# Patient Record
Sex: FEMALE | Race: WHITE | Employment: PART TIME | URBAN - METROPOLITAN AREA
[De-identification: names, ages, dates, MRNs, and addresses within clinical notes are randomized per-mention and may not be internally consistent; named-entity substitution may affect disease eponyms.]

---

## 2019-12-19 ENCOUNTER — OFFICE VISIT (OUTPATIENT)
Dept: OBGYN CLINIC | Age: 24
End: 2019-12-19

## 2019-12-19 VITALS
RESPIRATION RATE: 16 BRPM | HEIGHT: 65 IN | SYSTOLIC BLOOD PRESSURE: 112 MMHG | WEIGHT: 136.2 LBS | HEART RATE: 71 BPM | DIASTOLIC BLOOD PRESSURE: 73 MMHG | TEMPERATURE: 98.6 F | BODY MASS INDEX: 22.69 KG/M2

## 2019-12-19 DIAGNOSIS — N92.6 IRREGULAR MENSES: Primary | ICD-10-CM

## 2019-12-19 LAB
HCG URINE, QL. (POC): NEGATIVE
VALID INTERNAL CONTROL?: YES

## 2019-12-19 NOTE — PROGRESS NOTES
Chief Complaint   Patient presents with   Demetria Villaue IUD     IUD inserted 4/2019. Never had follow up visit. Pt c/o cramping, and pain with intercourse. LMP 7/2019, then spotting in 8/2019.    3 most recent PHQ Screens 12/19/2019   Little interest or pleasure in doing things Not at all   Feeling down, depressed, irritable, or hopeless Not at all   Total Score PHQ 2 0     1. Have you been to the ER, urgent care clinic since your last visit? Hospitalized since your last visit? No    2. Have you seen or consulted any other health care providers outside of the [address] [place] [address] since your last visit? Include any pap smears or colon screening.  No

## 2019-12-19 NOTE — PATIENT INSTRUCTIONS
Pregnancy Test (HCG): About This Test  What is it? A pregnancy test can check if the hormone hCG is in your blood or urine. Your body makes this hormone when you are pregnant. Many women use home pregnancy tests to find out if they are pregnant. This care sheet focuses on a pregnancy test done in a doctor's office or clinic. Why is this test done? This test will show if you are pregnant or not. A urine test checks if hCG is in your urine. A blood test can also measure the amount of hCG. If you are pregnant, your doctor will use the test results as a guide to care for you and your growing baby. How can you prepare for the test?  You don't need to do anything before the test.  What happens during the test?  A urine or blood test for pregnancy can be done in your doctor's office, clinic, or lab. · Blood test: A health professional takes a sample of your blood. · Urine test: You catch urine in a cup given to you by a health professional. When you are finished, you give the cup back. What else should you know about the test?  · This test may not show that you are pregnant if you are very early in your pregnancy. · If your tests show you aren't pregnant, but you or your doctor thinks you may be too early in your pregnancy, you may need another test.  How long does the test take? · The test will take just a few minutes. · Results from a urine test may be available right away. Blood test results may take a few days. What happens after the test?  · You can go back to your usual activities right away. If you are pregnant, you will get more information from your doctor. Follow-up care is a key part of your treatment and safety. Be sure to make and go to all appointments, and call your doctor if you are having problems. It's also a good idea to keep a list of the medicines you take. Ask your doctor when you can expect to have your test results. Where can you learn more?   Go to http://olya-vale.info/. Enter D128 in the search box to learn more about \"Pregnancy Test (HCG): About This Test.\"  Current as of: May 29, 2019  Content Version: 12.2  © 7776-5033 [employer]. Care instructions adapted under license by [employer] (which disclaims liability or warranty for this information). If you have questions about a medical condition or this instruction, always ask your healthcare professional. [address] any warranty or liability for your use of this information.

## 2019-12-19 NOTE — PROGRESS NOTES
NEW PATIENT EXAM  Young Adult Woman    SUBJECTIVE: Romana Leaf is a 27 y.o. female G0 using [address] since 2019. Pt. Reports some increased uterine cramping at times not requiring any treatment however denies changes in vaginal discharge. Pt. Reports her LMP of mid-2019 with just very light spotting during the interim. Pt. Did not 'go for a string check\" and is anxious that there may be problem since she did not get the follow up. Patient's last menstrual period was 2019. GYN History  Menarche: as teen  Contraception:  IUD  Sexual History:  unremarkable  Sexually transmitted diseases/infections: NO    Last pap: 2019  The prior Pap result: normal      History reviewed. No pertinent past medical history. History reviewed. No pertinent surgical history. OB History        0    Para   0    Term   0       0    AB   0    Living   0       SAB   0    TAB   0    Ectopic   0    Molar   0    Multiple   0    Live Births   0              History reviewed. No pertinent family history. Social History     Socioeconomic History    Marital status:      Spouse name: Not on file    Number of children: Not on file    Years of education: Not on file    Highest education level: Not on file   Occupational History    Not on file   Social Needs    Financial resource strain: Not on file    Food insecurity:     Worry: Not on file     Inability: Not on file    Transportation needs:     Medical: Not on file     Non-medical: Not on file   Tobacco Use    Smoking status: Never Smoker    Smokeless tobacco: Never Used   Substance and Sexual Activity    Alcohol use: Yes     Comment: occ wine every 2 weeks    Drug use: Never    Sexual activity: Yes     Partners: Male     Birth control/protection: I.U.D.    Lifestyle    Physical activity:     Days per week: Not on file     Minutes per session: Not on file    Stress: Not on file   Relationships    Social connections:     Talks on phone: Not on file     Gets together: Not on file     Attends Jain service: Not on file     Active member of club or organization: Not on file     Attends meetings of clubs or organizations: Not on file     Relationship status: Not on file    Intimate partner violence:     Fear of current or ex partner: Not on file     Emotionally abused: Not on file     Physically abused: Not on file     Forced sexual activity: Not on file   Other Topics Concern    Not on file   Social History Narrative    Not on file             Review of Systems:   Complete review of systems reviewed from social and history data forms. Pertinent positives in HPI. Objective:     Visit Vitals  /73 (BP 1 Location: Right arm, BP Patient Position: Sitting)   Pulse 71   Temp 98.6 °F (37 °C) (Oral)   Resp 16   Ht 5' 5\" (1.651 m)   Wt 136 lb 3.2 oz (61.8 kg)   LMP 07/16/2019   BMI 22.66 kg/m²       General:  alert, cooperative, no distress, appears stated age   Skin:  Normal.   Lymph Nodes:  Cervical, supraclavicular, and axillary nodes normal.   Breast Exam: normal appearance, no masses or tenderness    Lungs:  clear to auscultation bilaterally   Heart:  regular rate and rhythm, S1, S2 normal, no murmur, click, rub or gallop   Abdomen: soft, non-tender. Bowel sounds normal. No masses,  no organomegaly   Back:  Costovertebral angle tenderness absent   Genitourinary: BUS normal. Introitus normal. Normal appearing vaginal epithelium, Vaginal discharge described as normal and physiologic.,  Normal cervix without lesions or tenderness, string seen at os, Uterus normal size anteverted. NT., Adnexa normal in size left and right without tenderness. Extremities:  extremities normal, atraumatic, no cyanosis or edema         ASSESSMENT:      ICD-10-CM ICD-9-CM    1. Irregular menses N92.6 626.4 AMB POC URINE PREGNANCY TEST, VISUAL COLOR COMPARISON      Plan:  UPT negative today. String placement normal- reassurance given.   RTO with annual exam.   Pt. Voices understanding of treatment plan.       Arman Lesches, NP

## 2020-07-20 ENCOUNTER — TELEPHONE (OUTPATIENT)
Dept: OBGYN CLINIC | Age: 25
End: 2020-07-20

## 2020-07-20 NOTE — TELEPHONE ENCOUNTER
Patient has a IUD and she verbally said it has been in for over a year. She had a cycle at onset of insertion and no cycle since. She started with some light vaginal bleeding but no cramps recently. She said it shows up as drips of blood with urination. No cramping with urination or having any signs of UTI.;      Advised to try Ibuprofen protocol and document on a calendar. If continues to have IUD, it may be more than just the body having an \"off month \" of cycles. Will need to talk with FW if worsens or continues or bleeding heavily.

## 2020-08-05 ENCOUNTER — OFFICE VISIT (OUTPATIENT)
Dept: OBGYN CLINIC | Age: 25
End: 2020-08-05
Payer: COMMERCIAL

## 2020-08-05 VITALS
WEIGHT: 133 LBS | DIASTOLIC BLOOD PRESSURE: 72 MMHG | BODY MASS INDEX: 22.16 KG/M2 | SYSTOLIC BLOOD PRESSURE: 120 MMHG | HEIGHT: 65 IN

## 2020-08-05 DIAGNOSIS — Z01.419 WELL WOMAN EXAM: Primary | ICD-10-CM

## 2020-08-05 DIAGNOSIS — Z11.3 SCREENING EXAMINATION FOR STD (SEXUALLY TRANSMITTED DISEASE): ICD-10-CM

## 2020-08-05 PROCEDURE — 99395 PREV VISIT EST AGE 18-39: CPT | Performed by: OBSTETRICS & GYNECOLOGY

## 2020-08-05 NOTE — PATIENT INSTRUCTIONS
Pelvic Exam: Care Instructions  Your Care Instructions     When your doctor examines all of your pelvic organs, it's called a pelvic exam. Two good reasons to have this kind of exam are to check for sexually transmitted infections (STIs) and to get a Pap test. A Pap test is also called a Pap smear. It checks for early changes that can lead to cancer of the cervix. Sometimes a pelvic exam is part of a regular checkup. Your doctor may ask you to avoid vaginal sex, tampons, vaginal medicines, vaginal sprays or powders, and douching for 1 to 2 days before the test.  Other times, women have this kind of exam at any time of the month. This is because they have pelvic pain, bleeding, or discharge. Or they may have another pelvic problem. Before your exam, it's important to share some information with your doctor. For example, if you are a survivor of rape or sexual abuse, you can talk about any concerns you may have. Your doctor will also want to know if you are pregnant or use birth control. And he or she will want to hear about any problems, surgeries, or procedures you have had in your pelvic area. You will also need to tell your doctor when your last period was. Follow-up care is a key part of your treatment and safety. Be sure to make and go to all appointments, and call your doctor if you are having problems. It's also a good idea to know your test results and keep a list of the medicines you take. How is a pelvic exam done? · During a pelvic exam, you will:  ? Take off your clothes below the waist. You will get a paper or cloth cover to put over the lower half of your body. If this is regular checkup, you may undress completely and put on a gown. ? Lie on your back on an exam table. Your feet will be raised above you. Stirrups will support your feet. · The doctor will:  ? Ask you to relax your knees. Your knees need to lean out, toward the walls. ?  Check the opening of your vagina for sores or swelling. ? Gently put a tool called a speculum into your vagina. It opens the vagina a little bit. You will feel some pressure. But if you are relaxed, it will not hurt. It lets your doctor see inside the vagina. ? Use a small brush, spatula, or swab to get a sample of cells, if you are having a Pap test or culture. The doctor then removes the speculum. ? Put on gloves and put one or two fingers of one hand into your vagina. The other hand goes on your lower belly. This lets your doctor feel your pelvic organs. You will probably feel some pressure. Try to stay relaxed. ? Put one gloved finger into your rectum and one into your vagina, if needed. This can also help check your pelvic organs. This exam takes about 10 minutes. At the end, you will get a washcloth or tissue to clean your vaginal area. You can then get dressed. Why is a pelvic exam done? A pelvic exam may be done:  · As part of a woman's regular physical checkup. The exam may include a Pap test.  · To check for vaginal infection. · To check for sexually transmitted infections, such as chlamydia or herpes. · To help find the cause of abnormal uterine bleeding. · To look for problems like uterine fibroids, ovarian cysts, or uterine prolapse. · To find the cause of pelvic or belly pain. · Before inserting an intrauterine device (IUD) for birth control. · To collect evidence if you've been sexually assaulted. What are the risks of a pelvic exam?  There is a small chance that the doctor will find something on a pelvic exam that would not have caused a problem. This is called overdiagnosis. It could lead to tests or treatment you don't need. When should you call for help? Watch closely for changes in your health, and be sure to contact your doctor if you have any problems. Where can you learn more? Go to http://olya-vale.info/  Enter M421 in the search box to learn more about \"Pelvic Exam: Care Instructions. \"  Current as of: November 8, 2019               Content Version: 12.5  © 2407-4274 [employer]. Care instructions adapted under license by [employer] (which disclaims liability or warranty for this information). If you have questions about a medical condition or this instruction, always ask your healthcare professional. [address] any warranty or liability for your use of this information.

## 2020-08-05 NOTE — PROGRESS NOTES
Annual exam ages 21-44    [address] is a No obstetric history on file. ,  27 y.o. female   No LMP recorded. She presents for her annual checkup. She is having no significant problems. With regard to the Gardasil vaccine, she has received all 3 injections. Menstrual status:    Her periods are light to none due to IUD. She does not have dysmenorrhea. She reports no premenstrual symptoms. Contraception:    The current method of family planning is Hormonal IUS. Sexual history:    She  reports being sexually active and has had partner(s) who are Male. She reports using the following method of birth control/protection: I.U.D..    Medical conditions:    Since her last annual GYN exam about one year ago, she has not the following changes in her health history: none. Surgical history confirmed with patient. has a past surgical history that includes hx wisdom teeth extraction. Pap and Mammogram History:    Her most recent Pap smear was normal, obtained 1 year(s) ago. The patient has never had a mammogram.    Breast Cancer History/Substance Abuse: negative    No past medical history on file. Past Surgical History:   Procedure Laterality Date    HX WISDOM TEETH EXTRACTION         Current Outpatient Medications   Medication Sig Dispense Refill    miSOPROStol (CYTOTEC) 200 mcg tablet Take 1 Tab by mouth four (4) times daily. Take 1 by mouth, and insert 1 vaginally the night before your procedure 2 Tab 0     Allergies: Patient has no known allergies. Tobacco History:  reports that she has never smoked. She has never used smokeless tobacco.  Alcohol Abuse:  reports current alcohol use. Drug Abuse:  reports no history of drug use. Family Medical/Cancer History: No family history on file.      Review of Systems - History obtained from the patient  Constitutional: negative for weight loss, fever, night sweats  HEENT: negative for hearing loss, earache, congestion, snoring, sorethroat  CV: negative for chest pain, palpitations, edema  Resp: negative for cough, shortness of breath, wheezing  GI: negative for change in bowel habits, abdominal pain, black or bloody stools  : negative for frequency, dysuria, hematuria, vaginal discharge  MSK: negative for back pain, joint pain, muscle pain  Breast: negative for breast lumps, nipple discharge, galactorrhea  Skin :negative for itching, rash, hives  Neuro: negative for dizziness, headache, confusion, weakness  Psych: negative for anxiety, depression, change in mood  Heme/lymph: negative for bleeding, bruising, pallor    Physical Exam    Constitutional  · Appearance: well-nourished, well developed, alert, in no acute distress    HENT  · Head and Face: appears normal    Neck  · Inspection/Palpation: normal appearance, no masses or tenderness  · Lymph Nodes: no lymphadenopathy present  · Thyroid: gland size normal, nontender, no nodules or masses present on palpation    Chest  · Respiratory Effort: breathing unlabored    Breasts  · Inspection of Breasts: breasts symmetrical, no skin changes, no discharge present, nipple appearance normal, no skin retraction present  · Palpation of Breasts and Axillae: no masses present on palpation, no breast tenderness  · Axillary Lymph Nodes: no lymphadenopathy present    Gastrointestinal  · Abdominal Examination: abdomen non-tender to palpation, normal bowel sounds, no masses present  · Liver and spleen: no hepatomegaly present, spleen not palpable  · Hernias: no hernias identified    Genitourinary  · External Genitalia: normal appearance for age, no discharge present, no tenderness present, no inflammatory lesions present, no masses present, no atrophy present  · Vagina: normal vaginal vault without central or paravaginal defects, no discharge present, no inflammatory lesions present, no masses present  · Bladder: non-tender to palpation  · Urethra: appears normal  · Cervix: normal, iud strings appropriate length   · Uterus: normal size, shape and consistency  · Adnexa: no adnexal tenderness present, no adnexal masses present  · Perineum: perineum within normal limits, no evidence of trauma, no rashes or skin lesions present  · Anus: anus within normal limits, no hemorrhoids present  · Inguinal Lymph Nodes: no lymphadenopathy present    Skin  · General Inspection: no rash, no lesions identified    Neurologic/Psychiatric  · Mental Status:  · Orientation: grossly oriented to person, place and time  · Mood and Affect: mood normal, affect appropriate    Assessment:  Routine gynecologic examination  Her current medical status is satisfactory with no evidence of significant gynecologic issues.     Plan:  Counseled re: diet, exercise, healthy lifestyle  Return for yearly wellness visits  Gardisil counseling provided

## 2020-08-10 LAB
C TRACH RRNA SPEC QL NAA+PROBE: NEGATIVE
N GONORRHOEA RRNA SPEC QL NAA+PROBE: NEGATIVE
T VAGINALIS DNA SPEC QL NAA+PROBE: NEGATIVE

## 2021-08-16 NOTE — PROGRESS NOTES
Annual exam ages 21-44    [address] is a No obstetric history on file. ,  23 y.o. female   No LMP recorded. (Menstrual status: IUD). She presents for her annual checkup. She is having no significant problems. With regard to the Gardasil vaccine, she has received all 3 injections. Menstrual status:    Her periods are minimal to none using IUD. Contraception:    The current method of family planning is Hormonal IUS. Sexual history:    She  reports being sexually active and has had partner(s) who are Male. She reports using the following method of birth control/protection: I.U.D..    Medical conditions:    Since her last annual GYN exam about one year ago, she has not the following changes in her health history: none. Surgical history confirmed with patient. has a past surgical history that includes hx wisdom teeth extraction. Pap and Mammogram History:    Her most recent Pap smear was normal, obtained 2 year(s) ago. The patient has never had a mammogram.    Breast Cancer History/Substance Abuse: negative    Past Medical History:   Diagnosis Date    IUD (intrauterine device) in place 03/2019     Past Surgical History:   Procedure Laterality Date    HX WISDOM TEETH EXTRACTION         Current Outpatient Medications   Medication Sig Dispense Refill    miSOPROStol (CYTOTEC) 200 mcg tablet Take 1 Tab by mouth four (4) times daily. Take 1 by mouth, and insert 1 vaginally the night before your procedure 2 Tab 0     Allergies: Patient has no known allergies. Tobacco History:  reports that she has never smoked. She has never used smokeless tobacco.  Alcohol Abuse:  reports current alcohol use. Drug Abuse:  reports no history of drug use.     Family Medical/Cancer History:   Family History   Problem Relation Age of Onset    No Known Problems Mother         Review of Systems - History obtained from the patient  Constitutional: negative for weight loss, fever, night sweats  HEENT: negative for hearing loss, earache, congestion, snoring, sorethroat  CV: negative for chest pain, palpitations, edema  Resp: negative for cough, shortness of breath, wheezing  GI: negative for change in bowel habits, abdominal pain, black or bloody stools  : negative for frequency, dysuria, hematuria, vaginal discharge  MSK: negative for back pain, joint pain, muscle pain  Breast: negative for breast lumps, nipple discharge, galactorrhea  Skin :negative for itching, rash, hives  Neuro: negative for dizziness, headache, confusion, weakness  Psych: negative for anxiety, depression, change in mood  Heme/lymph: negative for bleeding, bruising, pallor    Physical Exam    There were no vitals taken for this visit.     Constitutional  · Appearance: well-nourished, well developed, alert, in no acute distress    HENT  · Head and Face: appears normal    Neck  · Inspection/Palpation: normal appearance, no masses or tenderness  · Lymph Nodes: no lymphadenopathy present  · Thyroid: gland size normal, nontender, no nodules or masses present on palpation    Chest  · Respiratory Effort: breathing unlabored    Breasts  · Inspection of Breasts: breasts symmetrical, no skin changes, no discharge present, nipple appearance normal, no skin retraction present  · Palpation of Breasts and Axillae: no masses present on palpation, no breast tenderness  · Axillary Lymph Nodes: no lymphadenopathy present    Gastrointestinal  · Abdominal Examination: abdomen non-tender to palpation, normal bowel sounds, no masses present  · Liver and spleen: no hepatomegaly present, spleen not palpable  · Hernias: no hernias identified    Genitourinary  · External Genitalia: normal appearance for age, no discharge present, no tenderness present, no inflammatory lesions present, no masses present, no atrophy present  · Vagina: normal vaginal vault without central or paravaginal defects, no discharge present, no inflammatory lesions present, no masses present  · Bladder: non-tender to palpation  · Urethra: appears normal  · Cervix: normal, iud strings appropriate length   · Uterus: normal size, shape and consistency  · Adnexa: no adnexal tenderness present, no adnexal masses present  · Perineum: perineum within normal limits, no evidence of trauma, no rashes or skin lesions present  · Anus: anus within normal limits, no hemorrhoids present  · Inguinal Lymph Nodes: no lymphadenopathy present    Skin  · General Inspection: no rash, no lesions identified    Neurologic/Psychiatric  · Mental Status:  · Orientation: grossly oriented to person, place and time  · Mood and Affect: mood normal, affect appropriate    Assessment:  Routine gynecologic examination  Her current medical status is satisfactory with no evidence of significant gynecologic issues.     Plan:  Counseled re: diet, exercise, healthy lifestyle  Return for yearly wellness visits

## 2021-08-16 NOTE — PATIENT INSTRUCTIONS
Learning About Birth Control: Intrauterine Device (IUD)  What is an intrauterine device (IUD)? The intrauterine device (IUD) is used to prevent pregnancy. It's a small, plastic, T-shaped device. Your doctor places the IUD in your uterus. If you and your doctor discuss it before you give birth, this can be done right after you have your baby. You have a choice between a hormonal IUD and a copper IUD. The hormonal IUD can prevent pregnancy for 3 to 6 years, depending on which IUD is used. But your doctor may talk to you about leaving it in for longer. When you have it, you don't have to do anything else to prevent pregnancy. The copper IUD can prevent pregnancy for 10 years. But your doctor may talk to you about leaving it in for longer. When you have it, you don't have to do anything else to prevent pregnancy. A string tied to the end of the IUD hangs down through the opening of the uterus (called the cervix) into the vagina. You can check that the IUD is in place by feeling for the string. The IUD usually stays in the uterus until your doctor removes it. How well does an IUD for birth control work? In the first year of use:  · When the hormonal IUD is used exactly as directed, fewer than 1 out of 100 women have an unplanned pregnancy. · When the copper IUD is used exactly as directed, fewer than 1 out of 100 women have an unplanned pregnancy. Be sure to tell your doctor about any health problems you have or medicines you take. Your doctor can help you choose the birth control method that's right for you. What are the advantages of an IUD? · An IUD is one of the most effective methods of birth control. · It prevents pregnancy for 3 to 10 years, depending on the type. You don't have to worry about birth control during this time. · It's safe to use while breastfeeding. · IUDs don't contain estrogen.  So you can use an IUD if you don't want to take estrogen or can't take estrogen because you have certain health problems or concerns. · An IUD is convenient. It is always providing birth control. You don't need to remember to take a pill or get a shot. You don't have to interrupt sex to protect against pregnancy. · A hormonal IUD may reduce heavy bleeding and cramping. What are the disadvantages of an IUD? · An IUD doesn't protect against sexually transmitted infections (STIs), such as herpes or HIV/AIDS. If you aren't sure if your sex partner might have an STI, use a condom to protect against disease. · A copper IUD may cause periods with more bleeding and cramping. · You have to see a doctor to have an IUD inserted and removed. Where can you learn more? Go to http://www.gray.com/  Enter B486 in the search box to learn more about \"Learning About Birth Control: Intrauterine Device (IUD). \"  Current as of: October 8, 2020               Content Version: 12.8  © 2006-2021 Healthwise, [facility]. Care instructions adapted under license by [employer] (which disclaims liability or warranty for this information). If you have questions about a medical condition or this instruction, always ask your healthcare professional. [place] [address] any warranty or liability for your use of this information.

## 2021-08-17 ENCOUNTER — OFFICE VISIT (OUTPATIENT)
Dept: OBGYN CLINIC | Age: 26
End: 2021-08-17
Payer: COMMERCIAL

## 2021-08-17 VITALS — DIASTOLIC BLOOD PRESSURE: 68 MMHG | WEIGHT: 135 LBS | BODY MASS INDEX: 22.47 KG/M2 | SYSTOLIC BLOOD PRESSURE: 108 MMHG

## 2021-08-17 DIAGNOSIS — Z11.3 SCREENING EXAMINATION FOR STD (SEXUALLY TRANSMITTED DISEASE): ICD-10-CM

## 2021-08-17 DIAGNOSIS — Z01.419 WELL WOMAN EXAM: Primary | ICD-10-CM

## 2021-08-17 PROCEDURE — 99395 PREV VISIT EST AGE 18-39: CPT | Performed by: OBSTETRICS & GYNECOLOGY

## 2021-08-19 LAB
C TRACH RRNA SPEC QL NAA+PROBE: NEGATIVE
N GONORRHOEA RRNA SPEC QL NAA+PROBE: NEGATIVE
SPECIMEN STATUS REPORT, ROLRST: NORMAL
T VAGINALIS DNA SPEC QL NAA+PROBE: NEGATIVE

## 2022-10-20 NOTE — PROGRESS NOTES
1. Have you been to the ER, urgent care clinic since your last visit? Hospitalized since your last visit? No    2. Have you seen or consulted any other health care providers outside of the [address] [place] [address] since your last visit? Include any pap smears or colon screening. No       Rupal Enter is a 24 y.o. female returns for an annual exam     No LMP recorded. (Menstrual status: IUD). Problems: no significant problems    Birth Control: IUD. Last Pap: normal obtained 3 year(s) ago. Last Mammogram: has never had a mammogram.      With regard to the Gardisil vaccine, she has received all 3 injections. Examination chaperoned by Dorie Arriaga.   Dorie Arriaga, [facility] [place]

## 2022-10-21 ENCOUNTER — OFFICE VISIT (OUTPATIENT)
Dept: OBGYN CLINIC | Age: 27
End: 2022-10-21
Payer: COMMERCIAL

## 2022-10-21 VITALS
WEIGHT: 138 LBS | SYSTOLIC BLOOD PRESSURE: 104 MMHG | HEIGHT: 64 IN | DIASTOLIC BLOOD PRESSURE: 73 MMHG | BODY MASS INDEX: 23.56 KG/M2

## 2022-10-21 DIAGNOSIS — Z01.419 WELL WOMAN EXAM: Primary | ICD-10-CM

## 2022-10-21 DIAGNOSIS — Z11.3 SCREENING EXAMINATION FOR STD (SEXUALLY TRANSMITTED DISEASE): ICD-10-CM

## 2022-10-21 PROCEDURE — 99395 PREV VISIT EST AGE 18-39: CPT | Performed by: OBSTETRICS & GYNECOLOGY

## 2022-10-21 NOTE — PROGRESS NOTES
Annual exam ages 23-44    [address] is a No obstetric history on file. ,  24 y.o. female   No LMP recorded. (Menstrual status: IUD). She presents for her annual checkup. She is having no significant problems. Menstrual status:    Her periods are absent in flow due to IUD    She does not have dysmenorrhea. She reports no premenstrual symptoms. Contraception:    The current method of family planning is Hormonal IUS. Sexual history:    She  reports being sexually active and has had partner(s) who are male. She reports using the following method of birth control/protection: I.U.D..    Medical conditions:    Since her last annual GYN exam about one year ago, she has not the following changes in her health history: none. Surgical history confirmed with patient. has a past surgical history that includes hx wisdom teeth extraction. Per Nursing Note today:  No LMP recorded. (Menstrual status: IUD). Problems: no significant problems   Birth Control: IUD. Last Pap: normal obtained 3 year(s) ago. Last Mammogram: has never had a mammogram.     With regard to the Gardisil vaccine, she has received all 3 injections. Past Medical History:   Diagnosis Date    IUD (intrauterine device) in place 03/2019     Past Surgical History:   Procedure Laterality Date    HX WISDOM TEETH EXTRACTION         Current Outpatient Medications   Medication Sig Dispense Refill    miSOPROStol (CYTOTEC) 200 mcg tablet Take 1 Tab by mouth four (4) times daily. Take 1 by mouth, and insert 1 vaginally the night before your procedure (Patient not taking: No sig reported) 2 Tab 0     Allergies: Patient has no known allergies. Tobacco History:  reports that she has never smoked. She has never used smokeless tobacco.  Alcohol Abuse:  reports current alcohol use. Drug Abuse:  reports no history of drug use.     Family Medical/Cancer History:   Family History   Problem Relation Age of Onset    No Known Problems Mother Review of Systems - History obtained from the patient  Constitutional: negative for weight loss, fever, night sweats  HEENT: negative for hearing loss, earache, congestion, snoring, sorethroat  CV: negative for chest pain, palpitations, edema  Resp: negative for cough, shortness of breath, wheezing  GI: negative for change in bowel habits, abdominal pain, black or bloody stools  : negative for frequency, dysuria, hematuria, vaginal discharge  MSK: negative for back pain, joint pain, muscle pain  Breast: negative for breast lumps, nipple discharge, galactorrhea  Skin :negative for itching, rash, hives  Neuro: negative for dizziness, headache, confusion, weakness  Psych: negative for anxiety, depression, change in mood  Heme/lymph: negative for bleeding, bruising, pallor    Physical Exam    Visit Vitals  /73   Ht 5' 4\" (1.626 m)   Wt 138 lb (62.6 kg)   BMI 23.69 kg/m²       Constitutional  Appearance: well-nourished, well developed, alert, in no acute distress    HENT  Head and Face: appears normal    Neck  Inspection/Palpation: normal appearance, no masses or tenderness  Lymph Nodes: no lymphadenopathy present  Thyroid: gland size normal, nontender, no nodules or masses present on palpation    Chest  Respiratory Effort: breathing unlabored    Breasts  Inspection of Breasts: breasts symmetrical, no skin changes, no discharge present, nipple appearance normal, no skin retraction present  Palpation of Breasts and Axillae: no masses present on palpation, no breast tenderness  Axillary Lymph Nodes: no lymphadenopathy present    Gastrointestinal  Abdominal Examination: abdomen non-tender to palpation, normal bowel sounds, no masses present  Liver and spleen: no hepatomegaly present, spleen not palpable  Hernias: no hernias identified    Genitourinary  External Genitalia: normal appearance for age, no discharge present, no tenderness present, no inflammatory lesions present, no masses present, no atrophy present  Vagina: normal vaginal vault without central or paravaginal defects, no discharge present, no inflammatory lesions present, no masses present  Bladder: non-tender to palpation  Urethra: appears normal  Cervix: normal, iud strings appropriate length   Uterus: normal size, shape and consistency  Adnexa: no adnexal tenderness present, no adnexal masses present  Perineum: perineum within normal limits, no evidence of trauma, no rashes or skin lesions present  Anus: anus within normal limits, no hemorrhoids present  Inguinal Lymph Nodes: no lymphadenopathy present    Skin  General Inspection: no rash, no lesions identified    Neurologic/Psychiatric  Mental Status:  Orientation: grossly oriented to person, place and time  Mood and Affect: mood normal, affect appropriate    Assessment:  Routine gynecologic examination  Her current medical status is satisfactory with no evidence of significant gynecologic issues.     Plan:  Counseled re: diet, exercise, healthy lifestyle  Return for yearly wellness visits

## 2022-10-27 LAB
C TRACH RRNA CVX QL NAA+PROBE: NEGATIVE
CYTOLOGIST CVX/VAG CYTO: NORMAL
CYTOLOGY CVX/VAG DOC CYTO: NORMAL
CYTOLOGY CVX/VAG DOC THIN PREP: NORMAL
DX ICD CODE: NORMAL
LABCORP, 190119: NORMAL
Lab: NORMAL
N GONORRHOEA RRNA CVX QL NAA+PROBE: NEGATIVE
OTHER STN SPEC: NORMAL
STAT OF ADQ CVX/VAG CYTO-IMP: NORMAL

## 2023-01-28 NOTE — PROGRESS NOTES
Radames Guadalupe is a 24 y.o. female presents for a problem visit. No chief complaint on file. Problems:  none      No LMP recorded. (Menstrual status: IUD). Birth Control: IUD. Last Pap: normal obtained 10/2022        1. Have you been to the ER, urgent care clinic, or hospitalized since your last visit? No    2. Have you seen or consulted any other health care providers outside of the [address] [place] [address] since your last visit? No      Chart reviewed for the following:   Charity STRICKLAND, have reviewed the History, Physical and updated the Allergic reactions for Bhavna Vahe performed immediately prior to start of procedure:   Alexandria STRICKLAND CMA, have performed the following reviews on Radames Guadalupe prior to the start of the procedure:            * Patient was identified by name and date of birth   * Agreement on procedure being performed was verified  * Risks and Benefits explained to the patient  * Procedure site verified and marked as necessary  * Patient was positioned for comfort  * Consent was signed and verified     Time: 1:10pm    Date of procedure: 1/30/23    Procedure performed by:  Meg Majano MD    Provider assisted by: Sheyla Hartman CMA     Patient assisted by: self    How tolerated by patient: tolerated the procedure well with no complications    Post Procedural Pain Scale: 2 - Hurts Little Bit    Comments: none    Examination chaperoned by Alexandria Poole CMA.

## 2023-01-30 ENCOUNTER — OFFICE VISIT (OUTPATIENT)
Dept: OBGYN CLINIC | Age: 28
End: 2023-01-30
Payer: COMMERCIAL

## 2023-01-30 DIAGNOSIS — Z30.432 ENCOUNTER FOR IUD REMOVAL: Primary | ICD-10-CM

## 2023-01-30 PROCEDURE — 58301 REMOVE INTRAUTERINE DEVICE: CPT | Performed by: OBSTETRICS & GYNECOLOGY

## 2023-01-30 NOTE — PROGRESS NOTES
IUD REMOVAL  Indications for Removal:  Rupal Enter is a No obstetric history on file. ,  24 y.o. female WHITE/NON- whose No LMP recorded. (Menstrual status: IUD). Who presents today for IUD removal. She is possibly interested in getting pregnant soon. The IUD removal procedure was discussed with the patient and she had no further questions. Procedure: The patient was placed in a dorsal lithotomy position and appropriately draped. On bimanual exam the uterus was anterior and normal in size with no tenderness present. A speculum exam was performed and the cervix was visualized. The cervix was prepped with zephiran solution. The IUD string was visualized. Using ring forceps , the string was grasped and the IUD removed intact. The IUD was shown to the patient.

## 2023-06-07 ENCOUNTER — ROUTINE PRENATAL (OUTPATIENT)
Age: 28
End: 2023-06-07

## 2023-06-07 VITALS — BODY MASS INDEX: 23.52 KG/M2 | SYSTOLIC BLOOD PRESSURE: 113 MMHG | DIASTOLIC BLOOD PRESSURE: 73 MMHG | WEIGHT: 137 LBS

## 2023-06-07 DIAGNOSIS — Z34.01 ENCOUNTER FOR SUPERVISION OF NORMAL FIRST PREGNANCY IN FIRST TRIMESTER: ICD-10-CM

## 2023-06-07 DIAGNOSIS — Z32.01 PREGNANCY EXAMINATION OR TEST, POSITIVE RESULT: Primary | ICD-10-CM

## 2023-06-07 DIAGNOSIS — Z32.01 PREGNANCY EXAMINATION OR TEST, POSITIVE RESULT: ICD-10-CM

## 2023-06-07 PROBLEM — Z34.00 SUPERVISION OF NORMAL FIRST PREGNANCY: Status: ACTIVE | Noted: 2023-06-07

## 2023-06-07 LAB
ABO, EXTERNAL RESULT: NORMAL
HEP B, EXTERNAL RESULT: NEGATIVE
HEPATITIS C ANTIBODY, EXTERNAL RESULT: NON REACTIVE
HIV, EXTERNAL RESULT: NON REACTIVE
RH FACTOR, EXTERNAL RESULT: NEGATIVE
RPR, EXTERNAL RESULT: NON REACTIVE
RUBELLA TITER, EXTERNAL RESULT: NORMAL

## 2023-06-07 PROCEDURE — 0502F SUBSEQUENT PRENATAL CARE: CPT | Performed by: OBSTETRICS & GYNECOLOGY

## 2023-06-07 NOTE — PROGRESS NOTES
Current pregnancy history:    Linda Dey is a ,  24 y.o. female White (non-) No LMP recorded. Patient is pregnant. .  She presents for the evaluation of amenorrhea and a positive pregnancy test.    Per nursing Note:  No LMP recorded. Patient is pregnant. Last Pap: see report obtained 1 year(s) ago. LMP history:  The date of her LMP is certain. Her last menstrual period was normal and lasted for 4 to 5 days. A urine pregnancy test was positive a few weeks ago. She was not on the pill at conception. Based on her LMP, her EDC is 24 and her EGA is 7 weeks,4 days. Her menstrual cycles are regular and occur approximately every 28 days and range from 3 to 5 days. The last menses lasted the usual number of days. Ultrasound data:  She had an ultrasound done by the ultrasound tech today which revealed a viable weston pregnancy with a gestational age of 7 weeks and 4 days giving an EDC of 24. Pregnancy symptoms:     Since her LMP she has experienced urinary frequency, breast tenderness, and nausea. She has not been vomiting over the last few weeks. Associated signs and symptoms which she denies: dysuria, discharge, vaginal bleeding. Relevant past pregnancy history:              She has the following pregnancy history: healthy               She has no history of  delivery. Relevant past medical history:(relevant to this pregnancy): noncontributory. Substance history: negative for alcohol, tobacco and street drugs. Positive for nothing. Exposure history: There is/are no indoor cat/s in the home. The patient was instructed to not change the cat litter. She admits close contact with children on a regular basis. She has had chicken pox or the vaccine in the past.   Patient denies issues with domestic violence.      Genetic Screening/Teratology Counseling: (Includes patient, baby's father, or anyone in either family with:)  1.  Patient's

## 2023-06-09 LAB
BACTERIA UR CULT: NO GROWTH
SPECIMEN STATUS REPORT: NORMAL

## 2023-06-11 LAB
C TRACH RRNA SPEC QL NAA+PROBE: NEGATIVE
N GONORRHOEA RRNA SPEC QL NAA+PROBE: NEGATIVE
SPECIMEN STATUS REPORT: NORMAL
T VAGINALIS RRNA SPEC QL NAA+PROBE: NEGATIVE

## 2023-07-07 ENCOUNTER — ROUTINE PRENATAL (OUTPATIENT)
Age: 28
End: 2023-07-07

## 2023-07-07 VITALS — BODY MASS INDEX: 23.34 KG/M2 | WEIGHT: 136 LBS | SYSTOLIC BLOOD PRESSURE: 130 MMHG | DIASTOLIC BLOOD PRESSURE: 73 MMHG

## 2023-07-07 DIAGNOSIS — Z34.01 ENCOUNTER FOR SUPERVISION OF NORMAL FIRST PREGNANCY IN FIRST TRIMESTER: Primary | ICD-10-CM

## 2023-07-18 LAB
EGFR GENE MUT TESTED BLD/T: NEGATIVE
KRAS GENE MUT ANL BLD/T: NEGATIVE
Lab: ABNORMAL
Lab: POSITIVE
MICROARRAY PLATFORM: NEGATIVE
NTRA ALPHA-THALASSEMIA: POSITIVE
NTRA BATTEN DISEASE (NEURONAL CEROID LIPOFUSCINOSIS, CLN3-RELATED): NEGATIVE
NTRA BETA-HEMOGLOBINOPATHIES: NEGATIVE
NTRA BLOOM SYNDROME: NEGATIVE
NTRA CANAVAN DISEASE: NEGATIVE
NTRA CITRULLINEMIA, TYPE I: NEGATIVE
NTRA CYSTIC FIBROSIS: NEGATIVE
NTRA DUCHENNE/BECKER MUSCULAR DYSTROPHY: NEGATIVE
NTRA FAMILIAL DYSAUTONOMIA: NEGATIVE
NTRA FANCONI ANEMIA, GROUP C: NEGATIVE
NTRA FRAGILE X SYNDROME: NEGATIVE
NTRA GALACTOSEMIA: NEGATIVE
NTRA GAUCHER DISEASE: NEGATIVE
NTRA GLYCOGEN STORAGE DISEASE, TYPE 1A: NEGATIVE
NTRA ISOVALERIC ACIDEMIA: NEGATIVE
NTRA MEDIUM CHAIN ACYL-COA DEHYDROGENASE DEFICIENCY: NEGATIVE
NTRA METHYLMALONIC ACIDURIA AND HOMOCYSTINURIA, TYPE CBLC: NEGATIVE
NTRA MUCOLIPIDOSIS, TYPE IV: NEGATIVE
NTRA POLYCYSTIC KIDNEY DISEASE, AUTOSOMAL RECESSIVE: NEGATIVE
NTRA SMITH-LEMLI-OPITZ SYNDROME: NEGATIVE
NTRA SPINAL MUSCULAR ATROPHY: NEGATIVE
NTRA TAY-SACHS DISEASE: NEGATIVE
NTRA TYROSINEMIA, TYPE I: NEGATIVE
NTRA ZELLWEGER SPECTRUM DISORDERS, PEX1-RELATED: NEGATIVE

## 2023-08-07 ENCOUNTER — ROUTINE PRENATAL (OUTPATIENT)
Age: 28
End: 2023-08-07

## 2023-08-07 VITALS — SYSTOLIC BLOOD PRESSURE: 111 MMHG | BODY MASS INDEX: 23.52 KG/M2 | DIASTOLIC BLOOD PRESSURE: 69 MMHG | WEIGHT: 137 LBS

## 2023-08-07 DIAGNOSIS — Z34.02 NORMAL FIRST PREGNANCY IN SECOND TRIMESTER: Primary | ICD-10-CM

## 2023-08-07 PROCEDURE — 0502F SUBSEQUENT PRENATAL CARE: CPT | Performed by: OBSTETRICS & GYNECOLOGY

## 2023-08-09 LAB
AFP INTERP SERPL-IMP: NORMAL
AFP INTERP SERPL-IMP: NORMAL
AFP MOM SERPL: 1.53
AFP SERPL-MCNC: 50.4 NG/ML
AGE AT DELIVERY: 26.3 YR
COMMENT: NORMAL
GA METHOD: NORMAL
GA: 15.2 WEEKS
IDDM PATIENT QL: NO
Lab: NORMAL
MULTIPLE PREGNANCY: NO
NEURAL TUBE DEFECT RISK FETUS: 2523 %

## 2023-09-05 SDOH — ECONOMIC STABILITY: INCOME INSECURITY: HOW HARD IS IT FOR YOU TO PAY FOR THE VERY BASICS LIKE FOOD, HOUSING, MEDICAL CARE, AND HEATING?: NOT HARD AT ALL

## 2023-09-05 SDOH — ECONOMIC STABILITY: TRANSPORTATION INSECURITY
IN THE PAST 12 MONTHS, HAS LACK OF TRANSPORTATION KEPT YOU FROM MEETINGS, WORK, OR FROM GETTING THINGS NEEDED FOR DAILY LIVING?: NO

## 2023-09-05 SDOH — ECONOMIC STABILITY: FOOD INSECURITY: WITHIN THE PAST 12 MONTHS, YOU WORRIED THAT YOUR FOOD WOULD RUN OUT BEFORE YOU GOT MONEY TO BUY MORE.: NEVER TRUE

## 2023-09-05 SDOH — ECONOMIC STABILITY: FOOD INSECURITY: WITHIN THE PAST 12 MONTHS, THE FOOD YOU BOUGHT JUST DIDN'T LAST AND YOU DIDN'T HAVE MONEY TO GET MORE.: NEVER TRUE

## 2023-09-05 SDOH — ECONOMIC STABILITY: HOUSING INSECURITY
IN THE LAST 12 MONTHS, WAS THERE A TIME WHEN YOU DID NOT HAVE A STEADY PLACE TO SLEEP OR SLEPT IN A SHELTER (INCLUDING NOW)?: NO

## 2023-09-08 ENCOUNTER — ROUTINE PRENATAL (OUTPATIENT)
Age: 28
End: 2023-09-08

## 2023-09-08 VITALS — WEIGHT: 139 LBS | SYSTOLIC BLOOD PRESSURE: 117 MMHG | BODY MASS INDEX: 23.86 KG/M2 | DIASTOLIC BLOOD PRESSURE: 75 MMHG

## 2023-09-08 DIAGNOSIS — Z34.00 SUPERVISION OF NORMAL FIRST PREGNANCY, ANTEPARTUM: ICD-10-CM

## 2023-09-08 NOTE — PROGRESS NOTES
Pt doing well, see prenatal flowsheet. Sub optimal ultrasound views, will repeat at next visit. Physician review of ultrasound performed by technician    Today's ultrasound report and images were reviewed and discussed with the patient.   Please see images and imaging report entered by technician in PACS for more detail and progress

## 2023-10-05 NOTE — PROGRESS NOTES
After obtaining consent, and per orders of Dr. Savannah Naylor, injection of influenza given IM by Vega Stack CMA. Patient instructed to remain in clinic for 20 minutes afterwards, and to report any adverse reaction to me immediately.

## 2023-10-06 ENCOUNTER — ROUTINE PRENATAL (OUTPATIENT)
Age: 28
End: 2023-10-06

## 2023-10-06 VITALS — DIASTOLIC BLOOD PRESSURE: 72 MMHG | BODY MASS INDEX: 25.4 KG/M2 | WEIGHT: 148 LBS | SYSTOLIC BLOOD PRESSURE: 108 MMHG

## 2023-10-06 DIAGNOSIS — Z34.00 SUPERVISION OF NORMAL FIRST PREGNANCY, ANTEPARTUM: Primary | ICD-10-CM

## 2023-10-06 DIAGNOSIS — Z23 ENCOUNTER FOR IMMUNIZATION: ICD-10-CM

## 2023-11-01 NOTE — PROGRESS NOTES
After obtaining consent, and per orders of Dr. Carlos Hyman, injection of TDAP given IM by Gauri Schaeffer CMA. Patient instructed to remain in clinic for 20 minutes afterwards, and to report any adverse reaction to me immediately.

## 2023-11-03 ENCOUNTER — ROUTINE PRENATAL (OUTPATIENT)
Age: 28
End: 2023-11-03

## 2023-11-03 VITALS — DIASTOLIC BLOOD PRESSURE: 72 MMHG | SYSTOLIC BLOOD PRESSURE: 113 MMHG | WEIGHT: 155 LBS | BODY MASS INDEX: 26.61 KG/M2

## 2023-11-03 DIAGNOSIS — Z23 ENCOUNTER FOR IMMUNIZATION: ICD-10-CM

## 2023-11-03 DIAGNOSIS — Z34.00 SUPERVISION OF NORMAL FIRST PREGNANCY, ANTEPARTUM: Primary | ICD-10-CM

## 2023-11-04 LAB
ERYTHROCYTE [DISTWIDTH] IN BLOOD BY AUTOMATED COUNT: 11.8 % (ref 11.7–15.4)
GLUCOSE 1H P 50 G GLC PO SERPL-MCNC: 72 MG/DL (ref 70–139)
HCT VFR BLD AUTO: 36.2 % (ref 34–46.6)
HGB BLD-MCNC: 12.1 G/DL (ref 11.1–15.9)
MCH RBC QN AUTO: 28.7 PG (ref 26.6–33)
MCHC RBC AUTO-ENTMCNC: 33.4 G/DL (ref 31.5–35.7)
MCV RBC AUTO: 86 FL (ref 79–97)
PLATELET # BLD AUTO: 316 X10E3/UL (ref 150–450)
RBC # BLD AUTO: 4.22 X10E6/UL (ref 3.77–5.28)
WBC # BLD AUTO: 14.1 X10E3/UL (ref 3.4–10.8)

## 2023-11-17 ENCOUNTER — ROUTINE PRENATAL (OUTPATIENT)
Age: 28
End: 2023-11-17

## 2023-11-17 VITALS — WEIGHT: 155 LBS | DIASTOLIC BLOOD PRESSURE: 68 MMHG | BODY MASS INDEX: 26.61 KG/M2 | SYSTOLIC BLOOD PRESSURE: 117 MMHG

## 2023-11-17 DIAGNOSIS — Z34.00 SUPERVISION OF NORMAL FIRST PREGNANCY, ANTEPARTUM: Primary | ICD-10-CM

## 2023-11-17 PROCEDURE — 0502F SUBSEQUENT PRENATAL CARE: CPT | Performed by: OBSTETRICS & GYNECOLOGY

## 2023-11-20 ENCOUNTER — ROUTINE PRENATAL (OUTPATIENT)
Age: 28
End: 2023-11-20

## 2023-11-20 VITALS — BODY MASS INDEX: 26.61 KG/M2 | WEIGHT: 155 LBS | DIASTOLIC BLOOD PRESSURE: 76 MMHG | SYSTOLIC BLOOD PRESSURE: 115 MMHG

## 2023-11-20 DIAGNOSIS — Z34.00 SUPERVISION OF NORMAL FIRST PREGNANCY, ANTEPARTUM: ICD-10-CM

## 2023-11-20 DIAGNOSIS — R10.2 PELVIC PAIN: ICD-10-CM

## 2023-11-20 DIAGNOSIS — R30.0 DYSURIA: Primary | ICD-10-CM

## 2023-11-20 PROCEDURE — 0502F SUBSEQUENT PRENATAL CARE: CPT | Performed by: OBSTETRICS & GYNECOLOGY

## 2023-11-20 RX ORDER — ONDANSETRON 8 MG/1
8 TABLET, ORALLY DISINTEGRATING ORAL 3 TIMES DAILY PRN
Qty: 10 TABLET | Refills: 0 | Status: SHIPPED | OUTPATIENT
Start: 2023-11-20

## 2023-11-20 RX ORDER — NITROFURANTOIN 25; 75 MG/1; MG/1
100 CAPSULE ORAL 2 TIMES DAILY
Qty: 20 CAPSULE | Refills: 0 | Status: SHIPPED | OUTPATIENT
Start: 2023-11-20 | End: 2023-11-25 | Stop reason: HOSPADM

## 2023-11-20 NOTE — PROGRESS NOTES
Pt presents with c/o left sided back and pain and pelvic pain. Some cramping and nausea with morning. Urine with + blood  Cervix long and closed  No CVAT    Pelvic pain and hematuria --likely UTI, discussed other etiologies including neprholithiaiss. Strict precautions discussed. Will send urine for culture and treat with Macrobid.

## 2023-11-22 ENCOUNTER — TELEPHONE (OUTPATIENT)
Age: 28
End: 2023-11-22

## 2023-11-22 LAB — BACTERIA UR CULT: NO GROWTH

## 2023-11-22 NOTE — TELEPHONE ENCOUNTER
PT name and  verified    35 yo last ov 23, next ov 23  , 30w4d      PT calling in reference to ov on 23 for L sided back pain and was discussed at ov may be related to nephrolithiaiss , PT states she \"passed a kidney stone\" that day and the pain subsided. Her urine cx came back negative and PT states she started last night again with the same pain, LL back pain and states that a possible xray was discussed at the visit and was wondering if she should get one since the pain is back? PT denies vaginal bleeding, leaking of fluid.     FW patient  Please advise  Thank you

## 2023-11-22 NOTE — TELEPHONE ENCOUNTER
PT call returned, name and  verified    MD message relayed:  Benoit Sharma MD  to Me        23 11:09 AM  Sounds like it very well could be another kidney stone. She may want to see a urologist, but I would defer that decision to Dr. Kasandra Mcelroy. She should take tylenol/motrin as needed and if the pain becomes acutely worsened, or she has fevers she should seek care in an emergency room. Benoit Sharma MD  to Me        23 11:10 AM  Sorry, no motrin in pregnancy   Benoit Sharma MD  to Me        23 11:11 AM  And drink lots and lots of fluid in the meantime       PT verbalizes understanding.

## 2023-11-23 ENCOUNTER — HOSPITAL ENCOUNTER (INPATIENT)
Facility: HOSPITAL | Age: 28
LOS: 1 days | Discharge: HOME | DRG: 832 | End: 2023-11-25
Attending: OBSTETRICS & GYNECOLOGY | Admitting: OBSTETRICS & GYNECOLOGY
Payer: COMMERCIAL

## 2023-11-23 ENCOUNTER — APPOINTMENT (OUTPATIENT)
Facility: HOSPITAL | Age: 28
DRG: 832 | End: 2023-11-23
Payer: COMMERCIAL

## 2023-11-23 DIAGNOSIS — O26.833 CALCULUS OF KIDNEY AFFECTING PREGNANCY IN THIRD TRIMESTER: Primary | ICD-10-CM

## 2023-11-23 DIAGNOSIS — N12 PYELONEPHRITIS: ICD-10-CM

## 2023-11-23 DIAGNOSIS — N20.0 CALCULUS OF KIDNEY AFFECTING PREGNANCY IN THIRD TRIMESTER: Primary | ICD-10-CM

## 2023-11-23 PROBLEM — O23.03 PYELONEPHRITIS AFFECTING PREGNANCY IN THIRD TRIMESTER: Status: ACTIVE | Noted: 2023-11-23

## 2023-11-23 PROBLEM — O99.891 BACK PAIN AFFECTING PREGNANCY IN THIRD TRIMESTER: Status: ACTIVE | Noted: 2023-11-23

## 2023-11-23 PROBLEM — Z3A.30 30 WEEKS GESTATION OF PREGNANCY: Status: ACTIVE | Noted: 2023-11-23

## 2023-11-23 PROBLEM — M54.9 BACK PAIN AFFECTING PREGNANCY IN THIRD TRIMESTER: Status: ACTIVE | Noted: 2023-11-23

## 2023-11-23 LAB
ALBUMIN SERPL-MCNC: 3 G/DL (ref 3.5–5)
ALBUMIN/GLOB SERPL: 0.7 (ref 1.1–2.2)
ALP SERPL-CCNC: 134 U/L (ref 45–117)
ALT SERPL-CCNC: 19 U/L (ref 12–78)
ANION GAP SERPL CALC-SCNC: 9 MMOL/L (ref 5–15)
APPEARANCE UR: ABNORMAL
AST SERPL-CCNC: 24 U/L (ref 15–37)
BACTERIA URNS QL MICRO: ABNORMAL /HPF
BASOPHILS # BLD: 0.1 K/UL (ref 0–0.1)
BASOPHILS NFR BLD: 0 % (ref 0–1)
BILIRUB SERPL-MCNC: 0.6 MG/DL (ref 0.2–1)
BILIRUB UR QL: NEGATIVE
BUN SERPL-MCNC: 7 MG/DL (ref 6–20)
BUN/CREAT SERPL: 7 (ref 12–20)
CALCIUM SERPL-MCNC: 8.8 MG/DL (ref 8.5–10.1)
CHLORIDE SERPL-SCNC: 102 MMOL/L (ref 97–108)
CO2 SERPL-SCNC: 24 MMOL/L (ref 21–32)
COLOR UR: ABNORMAL
CREAT SERPL-MCNC: 1.03 MG/DL (ref 0.55–1.02)
DIFFERENTIAL METHOD BLD: ABNORMAL
EOSINOPHIL # BLD: 0 K/UL (ref 0–0.4)
EOSINOPHIL NFR BLD: 0 % (ref 0–7)
EPITH CASTS URNS QL MICRO: ABNORMAL /LPF
ERYTHROCYTE [DISTWIDTH] IN BLOOD BY AUTOMATED COUNT: 12 % (ref 11.5–14.5)
GLOBULIN SER CALC-MCNC: 4.3 G/DL (ref 2–4)
GLUCOSE SERPL-MCNC: 90 MG/DL (ref 65–100)
GLUCOSE UR STRIP.AUTO-MCNC: NEGATIVE MG/DL
HCT VFR BLD AUTO: 35.2 % (ref 35–47)
HGB BLD-MCNC: 12 G/DL (ref 11.5–16)
HGB UR QL STRIP: NEGATIVE
IMM GRANULOCYTES # BLD AUTO: 0.2 K/UL (ref 0–0.04)
IMM GRANULOCYTES NFR BLD AUTO: 1 % (ref 0–0.5)
KETONES UR QL STRIP.AUTO: 40 MG/DL
LEUKOCYTE ESTERASE UR QL STRIP.AUTO: ABNORMAL
LYMPHOCYTES # BLD: 1.7 K/UL (ref 0.8–3.5)
LYMPHOCYTES NFR BLD: 9 % (ref 12–49)
MCH RBC QN AUTO: 28 PG (ref 26–34)
MCHC RBC AUTO-ENTMCNC: 34.1 G/DL (ref 30–36.5)
MCV RBC AUTO: 82.1 FL (ref 80–99)
MONOCYTES # BLD: 1.4 K/UL (ref 0–1)
MONOCYTES NFR BLD: 7 % (ref 5–13)
NEUTS SEG # BLD: 15.9 K/UL (ref 1.8–8)
NEUTS SEG NFR BLD: 83 % (ref 32–75)
NITRITE UR QL STRIP.AUTO: NEGATIVE
NRBC # BLD: 0 K/UL (ref 0–0.01)
NRBC BLD-RTO: 0 PER 100 WBC
PH UR STRIP: 7 (ref 5–8)
PLATELET # BLD AUTO: 323 K/UL (ref 150–400)
PMV BLD AUTO: 9.5 FL (ref 8.9–12.9)
POTASSIUM SERPL-SCNC: 3.8 MMOL/L (ref 3.5–5.1)
PROT SERPL-MCNC: 7.3 G/DL (ref 6.4–8.2)
PROT UR STRIP-MCNC: ABNORMAL MG/DL
RBC # BLD AUTO: 4.29 M/UL (ref 3.8–5.2)
RBC #/AREA URNS HPF: ABNORMAL /HPF (ref 0–5)
SODIUM SERPL-SCNC: 135 MMOL/L (ref 136–145)
SP GR UR REFRACTOMETRY: 1.01 (ref 1–1.03)
URINE CULTURE IF INDICATED: ABNORMAL
UROBILINOGEN UR QL STRIP.AUTO: 0.2 EU/DL (ref 0.2–1)
WBC # BLD AUTO: 19.2 K/UL (ref 3.6–11)
WBC URNS QL MICRO: ABNORMAL /HPF (ref 0–4)

## 2023-11-23 PROCEDURE — 87086 URINE CULTURE/COLONY COUNT: CPT

## 2023-11-23 PROCEDURE — 85025 COMPLETE CBC W/AUTO DIFF WBC: CPT

## 2023-11-23 PROCEDURE — 96361 HYDRATE IV INFUSION ADD-ON: CPT

## 2023-11-23 PROCEDURE — 81001 URINALYSIS AUTO W/SCOPE: CPT

## 2023-11-23 PROCEDURE — 4A1HXCZ MONITORING OF PRODUCTS OF CONCEPTION, CARDIAC RATE, EXTERNAL APPROACH: ICD-10-PCS | Performed by: OBSTETRICS & GYNECOLOGY

## 2023-11-23 PROCEDURE — G0378 HOSPITAL OBSERVATION PER HR: HCPCS

## 2023-11-23 PROCEDURE — 80053 COMPREHEN METABOLIC PANEL: CPT

## 2023-11-23 PROCEDURE — 76770 US EXAM ABDO BACK WALL COMP: CPT

## 2023-11-23 PROCEDURE — 36415 COLL VENOUS BLD VENIPUNCTURE: CPT

## 2023-11-23 PROCEDURE — 2580000003 HC RX 258: Performed by: OBSTETRICS & GYNECOLOGY

## 2023-11-23 PROCEDURE — 6370000000 HC RX 637 (ALT 250 FOR IP): Performed by: OBSTETRICS & GYNECOLOGY

## 2023-11-23 RX ORDER — OXYCODONE HYDROCHLORIDE 5 MG/1
5 TABLET ORAL EVERY 4 HOURS PRN
Status: DISCONTINUED | OUTPATIENT
Start: 2023-11-23 | End: 2023-11-24

## 2023-11-23 RX ORDER — ONDANSETRON 2 MG/ML
4 INJECTION INTRAMUSCULAR; INTRAVENOUS EVERY 6 HOURS PRN
Status: DISCONTINUED | OUTPATIENT
Start: 2023-11-23 | End: 2023-11-25 | Stop reason: HOSPADM

## 2023-11-23 RX ORDER — OXYCODONE HYDROCHLORIDE 5 MG/1
10 TABLET ORAL EVERY 4 HOURS PRN
Status: DISCONTINUED | OUTPATIENT
Start: 2023-11-23 | End: 2023-11-24

## 2023-11-23 RX ORDER — SODIUM CHLORIDE, SODIUM LACTATE, POTASSIUM CHLORIDE, CALCIUM CHLORIDE 600; 310; 30; 20 MG/100ML; MG/100ML; MG/100ML; MG/100ML
INJECTION, SOLUTION INTRAVENOUS CONTINUOUS
Status: DISCONTINUED | OUTPATIENT
Start: 2023-11-23 | End: 2023-11-25 | Stop reason: HOSPADM

## 2023-11-23 RX ORDER — SODIUM CHLORIDE, SODIUM LACTATE, POTASSIUM CHLORIDE, CALCIUM CHLORIDE 600; 310; 30; 20 MG/100ML; MG/100ML; MG/100ML; MG/100ML
INJECTION, SOLUTION INTRAVENOUS CONTINUOUS
Status: DISCONTINUED | OUTPATIENT
Start: 2023-11-23 | End: 2023-11-23

## 2023-11-23 RX ORDER — ACETAMINOPHEN 500 MG
1000 TABLET ORAL EVERY 8 HOURS SCHEDULED
Status: DISCONTINUED | OUTPATIENT
Start: 2023-11-23 | End: 2023-11-24

## 2023-11-23 RX ORDER — SODIUM CHLORIDE, SODIUM LACTATE, POTASSIUM CHLORIDE, AND CALCIUM CHLORIDE .6; .31; .03; .02 G/100ML; G/100ML; G/100ML; G/100ML
1000 INJECTION, SOLUTION INTRAVENOUS ONCE
Status: COMPLETED | OUTPATIENT
Start: 2023-11-23 | End: 2023-11-23

## 2023-11-23 RX ORDER — ONDANSETRON 4 MG/1
4 TABLET, ORALLY DISINTEGRATING ORAL EVERY 8 HOURS PRN
Status: DISCONTINUED | OUTPATIENT
Start: 2023-11-23 | End: 2023-11-25 | Stop reason: HOSPADM

## 2023-11-23 RX ORDER — OXYCODONE HYDROCHLORIDE 5 MG/1
5 TABLET ORAL ONCE
Status: COMPLETED | OUTPATIENT
Start: 2023-11-23 | End: 2023-11-23

## 2023-11-23 RX ADMIN — SODIUM CHLORIDE, POTASSIUM CHLORIDE, SODIUM LACTATE AND CALCIUM CHLORIDE 1000 ML: 600; 310; 30; 20 INJECTION, SOLUTION INTRAVENOUS at 15:04

## 2023-11-23 RX ADMIN — SODIUM CHLORIDE, POTASSIUM CHLORIDE, SODIUM LACTATE AND CALCIUM CHLORIDE: 600; 310; 30; 20 INJECTION, SOLUTION INTRAVENOUS at 16:40

## 2023-11-23 RX ADMIN — ACETAMINOPHEN 1000 MG: 500 TABLET ORAL at 21:14

## 2023-11-23 RX ADMIN — OXYCODONE 5 MG: 5 TABLET ORAL at 15:28

## 2023-11-23 RX ADMIN — SODIUM CHLORIDE, POTASSIUM CHLORIDE, SODIUM LACTATE AND CALCIUM CHLORIDE: 600; 310; 30; 20 INJECTION, SOLUTION INTRAVENOUS at 23:50

## 2023-11-23 RX ADMIN — OXYCODONE 10 MG: 5 TABLET ORAL at 23:50

## 2023-11-23 RX ADMIN — OXYCODONE 5 MG: 5 TABLET ORAL at 18:24

## 2023-11-23 ASSESSMENT — ENCOUNTER SYMPTOMS
CONSTIPATION: 0
TROUBLE SWALLOWING: 0
SHORTNESS OF BREATH: 0
CHEST TIGHTNESS: 0
VOMITING: 1
EYE PAIN: 0
RHINORRHEA: 0
COUGH: 0
BACK PAIN: 1
NAUSEA: 1
DIARRHEA: 0

## 2023-11-23 ASSESSMENT — PAIN DESCRIPTION - LOCATION
LOCATION: BACK
LOCATION: BACK
LOCATION: BACK;FLANK
LOCATION: FLANK

## 2023-11-23 ASSESSMENT — PAIN DESCRIPTION - DESCRIPTORS
DESCRIPTORS: SHARP
DESCRIPTORS: SHARP
DESCRIPTORS: ACHING
DESCRIPTORS: ACHING

## 2023-11-23 ASSESSMENT — PAIN - FUNCTIONAL ASSESSMENT
PAIN_FUNCTIONAL_ASSESSMENT: ACTIVITIES ARE NOT PREVENTED
PAIN_FUNCTIONAL_ASSESSMENT: ACTIVITIES ARE NOT PREVENTED

## 2023-11-23 ASSESSMENT — PAIN DESCRIPTION - ORIENTATION
ORIENTATION: LEFT
ORIENTATION: LEFT;POSTERIOR

## 2023-11-23 ASSESSMENT — PAIN SCALES - GENERAL
PAINLEVEL_OUTOF10: 8
PAINLEVEL_OUTOF10: 8
PAINLEVEL_OUTOF10: 7

## 2023-11-23 NOTE — H&P
L&D Triage    CC: left back pain      Subjective:     Patient is a 33 yo  @ 30w5d, JESUS 24, pt of Dr Courtney Han, who presents with complaint of left sided back pain ongoing x 1 day. Patient was diagnosed with a UTI on 23 after presenting to clinic with complaint of left lower back pain. She was started on Macrobid. This was discontinued yesterday after urine culture came back negative. She states the pain worsened on Tuesday and she went to [facility] today. She was diagnosed with a UTI, and received IM Rocephin. She was prescribed Omnicef and told to call her ob. Her ob instructed her to come in for evaluation for pyelonephritis. Labs at outside facility reviewed: WBC 17.6 H/H 11.8/35.8 Plt- 315. Seg neutrophils 83.8%. BMP: Cre 1.0. She has been taking Tylenol for pain, last took 500mg at 11am. Pain currently 7/10. Radiates to upper left pain and left groin. Denies fevers. Denies dysuria. +N/v- up to 6 episodes a day. Decreased appetite. She did pass small stones 4 days ago, denies prior history of stones. Hematuria has resolved. +Fm. Denies LOF/VB/CTXs. History reviewed. No pertinent past medical history.       Past Surgical History:   Procedure Laterality Date    WISDOM TOOTH EXTRACTION         Family History   Problem Relation Age of Onset    No Known Problems Mother         Social History     Socioeconomic History    Marital status:      Spouse name: Not on file    Number of children: Not on file    Years of education: Not on file    Highest education level: Not on file   Occupational History    Not on file   Tobacco Use    Smoking status: Never    Smokeless tobacco: Never   Substance and Sexual Activity    Alcohol use: Yes    Drug use: Never    Sexual activity: Not on file   Other Topics Concern    Not on file   Social History Narrative    ** Merged History Encounter **          Social Determinants of Health     Financial Resource Strain: Not on file   Food Insecurity: Not on file

## 2023-11-23 NOTE — PROGRESS NOTES
1415 Arrived to unit accompanied by significant other. Reports recent urgent care visit for left flank pain. Received dose of ABT prior to discharge from urgent care. Rates flank pain 7/10, radiates to abdomen at times. Reports passing recent kidney stones this past Monday. 46 Dr. Stefanie Buenrostro to bedside, patient consent to SVE. POC discussed. [facility] for tracing. 1710 Ultrasound tech to bedside. 1920 Bedside shift change report given to [address] Monge (oncoming nurse) by Patience Reeves RN (offgoing nurse). Report included the following information Nurse Handoff Report, MAR, and Recent Results.

## 2023-11-23 NOTE — PROGRESS NOTES
1900: Bedside and Verbal shift change report given to Milady Aguayo RN (oncoming nurse) by [facility], RN (offgoing nurse). Report included the following information Nurse Handoff Report, Intake/Output, MAR, Recent Results, and Quality Measures. 1955: Patient placed on EFM. 2015: Reactive NST. Patient denies needs at this time. 2150Brutus Sutherland from [address] [place] [address] MD to allow patient to eat until midnight. NPO after midnight. 2230: Patient diet changed to normal diet. 0215: SBAR report given to [facility].

## 2023-11-24 LAB
ANION GAP SERPL CALC-SCNC: 8 MMOL/L (ref 5–15)
BACTERIA SPEC CULT: NORMAL
BASOPHILS # BLD: 0.1 K/UL (ref 0–0.1)
BASOPHILS NFR BLD: 0 % (ref 0–1)
BUN SERPL-MCNC: 6 MG/DL (ref 6–20)
BUN/CREAT SERPL: 6 (ref 12–20)
CALCIUM SERPL-MCNC: 8.3 MG/DL (ref 8.5–10.1)
CHLORIDE SERPL-SCNC: 104 MMOL/L (ref 97–108)
CO2 SERPL-SCNC: 24 MMOL/L (ref 21–32)
CREAT SERPL-MCNC: 0.98 MG/DL (ref 0.55–1.02)
DIFFERENTIAL METHOD BLD: ABNORMAL
EOSINOPHIL # BLD: 0 K/UL (ref 0–0.4)
EOSINOPHIL NFR BLD: 0 % (ref 0–7)
ERYTHROCYTE [DISTWIDTH] IN BLOOD BY AUTOMATED COUNT: 12.2 % (ref 11.5–14.5)
GLUCOSE SERPL-MCNC: 92 MG/DL (ref 65–100)
HCT VFR BLD AUTO: 32 % (ref 35–47)
HGB BLD-MCNC: 10.6 G/DL (ref 11.5–16)
IMM GRANULOCYTES # BLD AUTO: 0.2 K/UL (ref 0–0.04)
IMM GRANULOCYTES NFR BLD AUTO: 1 % (ref 0–0.5)
LYMPHOCYTES # BLD: 1.4 K/UL (ref 0.8–3.5)
LYMPHOCYTES NFR BLD: 9 % (ref 12–49)
MCH RBC QN AUTO: 27.6 PG (ref 26–34)
MCHC RBC AUTO-ENTMCNC: 33.1 G/DL (ref 30–36.5)
MCV RBC AUTO: 83.3 FL (ref 80–99)
MONOCYTES # BLD: 1.4 K/UL (ref 0–1)
MONOCYTES NFR BLD: 9 % (ref 5–13)
NEUTS SEG # BLD: 12.7 K/UL (ref 1.8–8)
NEUTS SEG NFR BLD: 81 % (ref 32–75)
NRBC # BLD: 0 K/UL (ref 0–0.01)
NRBC BLD-RTO: 0 PER 100 WBC
PLATELET # BLD AUTO: 275 K/UL (ref 150–400)
PMV BLD AUTO: 9 FL (ref 8.9–12.9)
POTASSIUM SERPL-SCNC: 3.7 MMOL/L (ref 3.5–5.1)
RBC # BLD AUTO: 3.84 M/UL (ref 3.8–5.2)
SERVICE CMNT-IMP: NORMAL
SODIUM SERPL-SCNC: 136 MMOL/L (ref 136–145)
WBC # BLD AUTO: 15.7 K/UL (ref 3.6–11)

## 2023-11-24 PROCEDURE — 2580000003 HC RX 258: Performed by: OBSTETRICS & GYNECOLOGY

## 2023-11-24 PROCEDURE — 96374 THER/PROPH/DIAG INJ IV PUSH: CPT

## 2023-11-24 PROCEDURE — 6370000000 HC RX 637 (ALT 250 FOR IP): Performed by: OBSTETRICS & GYNECOLOGY

## 2023-11-24 PROCEDURE — 96375 TX/PRO/DX INJ NEW DRUG ADDON: CPT

## 2023-11-24 PROCEDURE — G0378 HOSPITAL OBSERVATION PER HR: HCPCS

## 2023-11-24 PROCEDURE — 6360000002 HC RX W HCPCS: Performed by: OBSTETRICS & GYNECOLOGY

## 2023-11-24 PROCEDURE — 85025 COMPLETE CBC W/AUTO DIFF WBC: CPT

## 2023-11-24 PROCEDURE — 99232 SBSQ HOSP IP/OBS MODERATE 35: CPT | Performed by: OBSTETRICS & GYNECOLOGY

## 2023-11-24 PROCEDURE — 36415 COLL VENOUS BLD VENIPUNCTURE: CPT

## 2023-11-24 PROCEDURE — 80048 BASIC METABOLIC PNL TOTAL CA: CPT

## 2023-11-24 RX ORDER — HYDROCODONE BITARTRATE AND ACETAMINOPHEN 5; 325 MG/1; MG/1
1 TABLET ORAL EVERY 4 HOURS PRN
Status: DISCONTINUED | OUTPATIENT
Start: 2023-11-24 | End: 2023-11-24

## 2023-11-24 RX ORDER — HYDROCODONE BITARTRATE AND ACETAMINOPHEN 5; 325 MG/1; MG/1
1 TABLET ORAL EVERY 4 HOURS PRN
Status: DISCONTINUED | OUTPATIENT
Start: 2023-11-24 | End: 2023-11-25 | Stop reason: HOSPADM

## 2023-11-24 RX ORDER — DOCUSATE SODIUM 100 MG/1
100 CAPSULE, LIQUID FILLED ORAL 2 TIMES DAILY
Status: DISCONTINUED | OUTPATIENT
Start: 2023-11-24 | End: 2023-11-25 | Stop reason: HOSPADM

## 2023-11-24 RX ORDER — POLYETHYLENE GLYCOL 3350 17 G/17G
17 POWDER, FOR SOLUTION ORAL DAILY
Status: DISCONTINUED | OUTPATIENT
Start: 2023-11-24 | End: 2023-11-25 | Stop reason: HOSPADM

## 2023-11-24 RX ORDER — ACETAMINOPHEN 325 MG/1
650 TABLET ORAL EVERY 4 HOURS PRN
Status: DISCONTINUED | OUTPATIENT
Start: 2023-11-24 | End: 2023-11-25 | Stop reason: HOSPADM

## 2023-11-24 RX ORDER — HYDROCODONE BITARTRATE AND ACETAMINOPHEN 5; 325 MG/1; MG/1
2 TABLET ORAL EVERY 4 HOURS PRN
Status: DISCONTINUED | OUTPATIENT
Start: 2023-11-24 | End: 2023-11-25 | Stop reason: HOSPADM

## 2023-11-24 RX ADMIN — WATER 1000 MG: 1 INJECTION INTRAMUSCULAR; INTRAVENOUS; SUBCUTANEOUS at 06:30

## 2023-11-24 RX ADMIN — HYDROCODONE BITARTRATE AND ACETAMINOPHEN 1 TABLET: 5; 325 TABLET ORAL at 11:54

## 2023-11-24 RX ADMIN — HYDROCODONE BITARTRATE AND ACETAMINOPHEN 1 TABLET: 5; 325 TABLET ORAL at 23:59

## 2023-11-24 RX ADMIN — SODIUM CHLORIDE, POTASSIUM CHLORIDE, SODIUM LACTATE AND CALCIUM CHLORIDE: 600; 310; 30; 20 INJECTION, SOLUTION INTRAVENOUS at 07:53

## 2023-11-24 RX ADMIN — HYDROCODONE BITARTRATE AND ACETAMINOPHEN 1 TABLET: 5; 325 TABLET ORAL at 02:41

## 2023-11-24 RX ADMIN — HYDROCODONE BITARTRATE AND ACETAMINOPHEN 1 TABLET: 5; 325 TABLET ORAL at 06:31

## 2023-11-24 RX ADMIN — ONDANSETRON 4 MG: 2 INJECTION INTRAMUSCULAR; INTRAVENOUS at 12:04

## 2023-11-24 RX ADMIN — DOCUSATE SODIUM 100 MG: 100 CAPSULE, LIQUID FILLED ORAL at 22:36

## 2023-11-24 RX ADMIN — POLYETHYLENE GLYCOL 3350 17 G: 17 POWDER, FOR SOLUTION ORAL at 22:36

## 2023-11-24 RX ADMIN — HYDROCODONE BITARTRATE AND ACETAMINOPHEN 1 TABLET: 5; 325 TABLET ORAL at 15:55

## 2023-11-24 RX ADMIN — SODIUM CHLORIDE, POTASSIUM CHLORIDE, SODIUM LACTATE AND CALCIUM CHLORIDE: 600; 310; 30; 20 INJECTION, SOLUTION INTRAVENOUS at 15:56

## 2023-11-24 RX ADMIN — SODIUM CHLORIDE, POTASSIUM CHLORIDE, SODIUM LACTATE AND CALCIUM CHLORIDE: 600; 310; 30; 20 INJECTION, SOLUTION INTRAVENOUS at 23:57

## 2023-11-24 RX ADMIN — HYDROCODONE BITARTRATE AND ACETAMINOPHEN 1 TABLET: 5; 325 TABLET ORAL at 20:06

## 2023-11-24 ASSESSMENT — PAIN DESCRIPTION - DESCRIPTORS
DESCRIPTORS: DULL;ACHING
DESCRIPTORS: DISCOMFORT

## 2023-11-24 ASSESSMENT — PAIN SCALES - GENERAL
PAINLEVEL_OUTOF10: 6
PAINLEVEL_OUTOF10: 7

## 2023-11-24 ASSESSMENT — PAIN DESCRIPTION - ORIENTATION
ORIENTATION: LEFT
ORIENTATION: POSTERIOR

## 2023-11-24 ASSESSMENT — PAIN - FUNCTIONAL ASSESSMENT: PAIN_FUNCTIONAL_ASSESSMENT: ACTIVITIES ARE NOT PREVENTED

## 2023-11-24 ASSESSMENT — PAIN DESCRIPTION - LOCATION
LOCATION: BACK

## 2023-11-24 NOTE — PROGRESS NOTES
0215: SBAR report received from [facility].     Lis Hopper: This RN called to bedside. Pt reporting 9/10 pain on her left side. 0228: Dr. Ellie Damian notified of pt pain level.     0700: Bedside and Verbal shift change report given to Bridget Méndez (oncoming nurse) by Ana Craig (offgoing nurse). Report included the following information Nurse Handoff Report, Index, Intake/Output, MAR, and Recent Results.

## 2023-11-25 VITALS
OXYGEN SATURATION: 100 % | HEIGHT: 64 IN | BODY MASS INDEX: 26.29 KG/M2 | SYSTOLIC BLOOD PRESSURE: 114 MMHG | DIASTOLIC BLOOD PRESSURE: 67 MMHG | TEMPERATURE: 97.7 F | RESPIRATION RATE: 16 BRPM | HEART RATE: 92 BPM | WEIGHT: 154 LBS

## 2023-11-25 PROBLEM — N13.30 HYDRONEPHROSIS, BILATERAL: Status: ACTIVE | Noted: 2023-11-25

## 2023-11-25 PROBLEM — N20.0 CALCULUS OF KIDNEY AFFECTING PREGNANCY IN THIRD TRIMESTER: Status: ACTIVE | Noted: 2023-11-14

## 2023-11-25 PROBLEM — O99.891 CALCULUS OF KIDNEY AFFECTING PREGNANCY IN THIRD TRIMESTER: Status: ACTIVE | Noted: 2023-11-14

## 2023-11-25 PROBLEM — O26.833 CALCULUS OF KIDNEY AFFECTING PREGNANCY IN THIRD TRIMESTER: Status: ACTIVE | Noted: 2023-11-14

## 2023-11-25 PROBLEM — Z3A.31 31 WEEKS GESTATION OF PREGNANCY: Status: ACTIVE | Noted: 2023-11-23

## 2023-11-25 PROBLEM — N12 PYELONEPHRITIS: Status: RESOLVED | Noted: 2023-11-25 | Resolved: 2023-11-25

## 2023-11-25 PROBLEM — N12 PYELONEPHRITIS: Status: ACTIVE | Noted: 2023-11-25

## 2023-11-25 LAB
ERYTHROCYTE [DISTWIDTH] IN BLOOD BY AUTOMATED COUNT: 12.2 % (ref 11.5–14.5)
HCT VFR BLD AUTO: 30.9 % (ref 35–47)
HGB BLD-MCNC: 10.1 G/DL (ref 11.5–16)
MCH RBC QN AUTO: 27 PG (ref 26–34)
MCHC RBC AUTO-ENTMCNC: 32.7 G/DL (ref 30–36.5)
MCV RBC AUTO: 82.6 FL (ref 80–99)
NRBC # BLD: 0 K/UL (ref 0–0.01)
NRBC BLD-RTO: 0 PER 100 WBC
PLATELET # BLD AUTO: 265 K/UL (ref 150–400)
PMV BLD AUTO: 9.3 FL (ref 8.9–12.9)
RBC # BLD AUTO: 3.74 M/UL (ref 3.8–5.2)
WBC # BLD AUTO: 14.4 K/UL (ref 3.6–11)

## 2023-11-25 PROCEDURE — 96376 TX/PRO/DX INJ SAME DRUG ADON: CPT

## 2023-11-25 PROCEDURE — 85027 COMPLETE CBC AUTOMATED: CPT

## 2023-11-25 PROCEDURE — 2580000003 HC RX 258: Performed by: OBSTETRICS & GYNECOLOGY

## 2023-11-25 PROCEDURE — 99232 SBSQ HOSP IP/OBS MODERATE 35: CPT | Performed by: OBSTETRICS & GYNECOLOGY

## 2023-11-25 PROCEDURE — G0378 HOSPITAL OBSERVATION PER HR: HCPCS

## 2023-11-25 PROCEDURE — 1100000000 HC RM PRIVATE

## 2023-11-25 PROCEDURE — 36415 COLL VENOUS BLD VENIPUNCTURE: CPT

## 2023-11-25 PROCEDURE — 6360000002 HC RX W HCPCS: Performed by: OBSTETRICS & GYNECOLOGY

## 2023-11-25 RX ORDER — HYDROCODONE BITARTRATE AND ACETAMINOPHEN 5; 325 MG/1; MG/1
1 TABLET ORAL EVERY 6 HOURS PRN
Qty: 20 TABLET | Refills: 0 | Status: SHIPPED | OUTPATIENT
Start: 2023-11-25 | End: 2023-12-02

## 2023-11-25 RX ORDER — CEPHALEXIN 500 MG/1
500 CAPSULE ORAL 3 TIMES DAILY
Qty: 21 CAPSULE | Refills: 0 | Status: SHIPPED | OUTPATIENT
Start: 2023-11-25 | End: 2023-12-02

## 2023-11-25 RX ADMIN — SODIUM CHLORIDE, POTASSIUM CHLORIDE, SODIUM LACTATE AND CALCIUM CHLORIDE: 600; 310; 30; 20 INJECTION, SOLUTION INTRAVENOUS at 07:37

## 2023-11-25 RX ADMIN — WATER 1000 MG: 1 INJECTION INTRAMUSCULAR; INTRAVENOUS; SUBCUTANEOUS at 06:08

## 2023-11-25 NOTE — PROGRESS NOTES
1900: Bedside and Verbal shift change report given to GUSTAVO Hua RN (oncoming nurse) by Ike Blunt. Report included the following information Nurse Handoff Report, Index, Intake/Output, MAR, and Recent Results. 2057: NST reactive. Verified by DANNY Coffman RN.    8181: Pt reports 9/10 pain on left flank. Spoke with MD at this time. MD okay with giving Norco early and pt to use urine strainer every time she urinates. 0230: Pt refused Norco, stating that \"fluids and walking around is helping\". 0700: Bedside and Verbal shift change report given to [facility] (oncoming nurse) by GUSTAVO Hua RN (offgoing nurse). Report included the following information Nurse Handoff Report, Index, Intake/Output, MAR, and Recent Results.

## 2023-11-25 NOTE — DISCHARGE SUMMARY
Antepartum Discharge Summary     Name: Kat Melendez MRN: [id number]  SSN: [id number]    YOB: 1995  Age: 34 y.o. Sex: female      Admit Date: 11/23/2023    Discharge Date: 11/25/2023     Admitting Physician: Charles Jose MD     Attending Physician:  Haley att. providers found     Admission Diagnoses: Back pain affecting pregnancy in third trimester [O99.891, M54.9]  Pyelonephritis affecting pregnancy in third trimester [O23.03]  Pyelonephritis [N12]    Discharge Diagnoses: Principal Problem:    Pyelonephritis affecting pregnancy in third trimester  Active Problems:    Back pain affecting pregnancy in third trimester    31 weeks gestation of pregnancy    Hydronephrosis, bilateral    Calculus of kidney affecting pregnancy in third trimester  Resolved Problems:    Pyelonephritis  \    Immunization(s):   Immunization History   Administered Date(s) Administered    Influenza, FLUCELVAX, (age 10 mo+), MDCK, PF, 0.5mL 10/06/2023    TDaP, ADACEL (age 6y-58y), BOOSTRIX (age 10y+), IM, 0.5mL 11/03/2023        Hospital Course:    Pt treated with IV Abx & pain meds. Leukocytosis and pain resolved over several days. US showed bilateral mild/mod hydroneprhrosis  The patient was released to her home in good condition. Patient Instructions:   Reference my discharge instructions. I spent 35 minutes discharging the patient in face to face contact.     Discharge Medication List as of 11/25/2023  9:44 AM        CONTINUE these medications which have NOT CHANGED    Details   Prenatal Vit-Fe Fumarate-FA (PRENATAL 1+1 PO) Take by mouth dailyHistorical Med      ondansetron (ZOFRAN-ODT) 8 MG TBDP disintegrating tablet Take 1 tablet by mouth 3 times daily as needed for Nausea or Vomiting, Disp-10 tablet, R-0Normal      nitrofurantoin, macrocrystal-monohydrate, (MACROBID) 100 MG capsule Take 1 capsule by mouth 2 times daily for 10 days, Disp-20 capsule, R-0Normal             Follow-up Information       Follow up

## 2023-11-25 NOTE — PROGRESS NOTES
1050 This RN discharging this PT. Discharge instructions reviewed with the PT. IV removed. PT agrees and has no questions or concerns at this time. 1055 PT ambulates off unit.

## 2023-12-04 ENCOUNTER — ROUTINE PRENATAL (OUTPATIENT)
Age: 28
End: 2023-12-04

## 2023-12-04 VITALS — SYSTOLIC BLOOD PRESSURE: 111 MMHG | BODY MASS INDEX: 26.14 KG/M2 | DIASTOLIC BLOOD PRESSURE: 73 MMHG | WEIGHT: 152.3 LBS

## 2023-12-04 DIAGNOSIS — N13.30 HYDRONEPHROSIS, BILATERAL: Primary | ICD-10-CM

## 2023-12-04 DIAGNOSIS — N20.0 CALCULUS OF KIDNEY AFFECTING PREGNANCY IN THIRD TRIMESTER: ICD-10-CM

## 2023-12-04 DIAGNOSIS — Z3A.31 31 WEEKS GESTATION OF PREGNANCY: ICD-10-CM

## 2023-12-04 DIAGNOSIS — Z34.00 SUPERVISION OF NORMAL FIRST PREGNANCY, ANTEPARTUM: ICD-10-CM

## 2023-12-04 DIAGNOSIS — O26.833 CALCULUS OF KIDNEY AFFECTING PREGNANCY IN THIRD TRIMESTER: ICD-10-CM

## 2023-12-04 DIAGNOSIS — O99.891 BACK PAIN AFFECTING PREGNANCY IN THIRD TRIMESTER: ICD-10-CM

## 2023-12-04 DIAGNOSIS — M54.9 BACK PAIN AFFECTING PREGNANCY IN THIRD TRIMESTER: ICD-10-CM

## 2023-12-04 DIAGNOSIS — O23.03 PYELONEPHRITIS AFFECTING PREGNANCY IN THIRD TRIMESTER: ICD-10-CM

## 2023-12-04 PROCEDURE — 0502F SUBSEQUENT PRENATAL CARE: CPT | Performed by: OBSTETRICS & GYNECOLOGY

## 2023-12-04 NOTE — PROGRESS NOTES
Pt doing well, see prenatal flowsheet. Hospitalized Nov 23 for pyelo. Reports she has finished the antibiotics and feels much better. No complaints today.

## 2023-12-29 ENCOUNTER — ROUTINE PRENATAL (OUTPATIENT)
Age: 28
End: 2023-12-29

## 2023-12-29 VITALS — DIASTOLIC BLOOD PRESSURE: 72 MMHG | SYSTOLIC BLOOD PRESSURE: 115 MMHG | BODY MASS INDEX: 26.43 KG/M2 | WEIGHT: 154 LBS

## 2023-12-29 DIAGNOSIS — M54.9 BACK PAIN AFFECTING PREGNANCY IN THIRD TRIMESTER: ICD-10-CM

## 2023-12-29 DIAGNOSIS — N13.30 HYDRONEPHROSIS, BILATERAL: Primary | ICD-10-CM

## 2023-12-29 DIAGNOSIS — O26.833 CALCULUS OF KIDNEY AFFECTING PREGNANCY IN THIRD TRIMESTER: ICD-10-CM

## 2023-12-29 DIAGNOSIS — O99.891 BACK PAIN AFFECTING PREGNANCY IN THIRD TRIMESTER: ICD-10-CM

## 2023-12-29 DIAGNOSIS — Z34.00 SUPERVISION OF NORMAL FIRST PREGNANCY, ANTEPARTUM: ICD-10-CM

## 2023-12-29 DIAGNOSIS — O23.03 PYELONEPHRITIS AFFECTING PREGNANCY IN THIRD TRIMESTER: ICD-10-CM

## 2023-12-29 DIAGNOSIS — Z3A.31 31 WEEKS GESTATION OF PREGNANCY: ICD-10-CM

## 2023-12-29 DIAGNOSIS — N20.0 CALCULUS OF KIDNEY AFFECTING PREGNANCY IN THIRD TRIMESTER: ICD-10-CM

## 2023-12-29 PROCEDURE — 0502F SUBSEQUENT PRENATAL CARE: CPT | Performed by: OBSTETRICS & GYNECOLOGY

## 2023-12-29 RX ORDER — RESPIRATORY SYNCYTIAL VIRUS VACCINE 120MCG/0.5
0.5 KIT INTRAMUSCULAR ONCE
Qty: 1 EACH | Refills: 0 | Status: SHIPPED | OUTPATIENT
Start: 2023-12-29 | End: 2023-12-29

## 2023-12-29 NOTE — PROGRESS NOTES
Pt doing well, see prenatal flowsheet. Abrysvo today  GBS today  Baby still breach, discussed scheduled c/s, version, version+IOL with C/S if unsuccessful. She will consider her options and we can further discuss at next visit.

## 2023-12-31 LAB — GP B STREP DNA SPEC QL NAA+PROBE: NEGATIVE

## 2024-01-09 ENCOUNTER — ROUTINE PRENATAL (OUTPATIENT)
Age: 29
End: 2024-01-09

## 2024-01-09 ENCOUNTER — PATIENT MESSAGE (OUTPATIENT)
Age: 29
End: 2024-01-09

## 2024-01-09 VITALS — WEIGHT: 156 LBS | BODY MASS INDEX: 26.78 KG/M2 | DIASTOLIC BLOOD PRESSURE: 80 MMHG | SYSTOLIC BLOOD PRESSURE: 115 MMHG

## 2024-01-09 DIAGNOSIS — Z34.00 SUPERVISION OF NORMAL FIRST PREGNANCY, ANTEPARTUM: Primary | ICD-10-CM

## 2024-01-09 PROCEDURE — 0502F SUBSEQUENT PRENATAL CARE: CPT | Performed by: OBSTETRICS & GYNECOLOGY

## 2024-01-09 NOTE — PROGRESS NOTES
Pt doing well, see prenatal flowsheet.  Discussed r/b/a of c/s vs version, pt desires primary c/s for breech. She will notify of requested date.   Discussed routine precautions.

## 2024-01-10 NOTE — TELEPHONE ENCOUNTER
Please call pt, baby is breach.  If frequent contractions after getting off her feel and drinking water then should go to L&D for an exam.  The spotting I am not not concerned about since she had an exam yesterday and the discharge could be from the gel used to check her.  We discussed that breech babies often labor quickly so if she is still khalida should come in.  If they have spaced out then she can observe and notify if they intensity or increase in frequency.

## 2024-01-10 NOTE — TELEPHONE ENCOUNTER
This nurse reached the patient and she reports contractions continue maybe slight more frequently but she has not been able to time them due to teaching    Patient was advised of MD recommendations and once home to increase po fluids and to monitor the contractions and call MD after hours to go to labor and delivery to be checked  Patient verbalized understanding.

## 2024-01-18 ASSESSMENT — PATIENT HEALTH QUESTIONNAIRE - PHQ9
SUM OF ALL RESPONSES TO PHQ QUESTIONS 1-9: 0
SUM OF ALL RESPONSES TO PHQ QUESTIONS 1-9: 0
1. LITTLE INTEREST OR PLEASURE IN DOING THINGS: 0
SUM OF ALL RESPONSES TO PHQ9 QUESTIONS 1 & 2: 0
2. FEELING DOWN, DEPRESSED OR HOPELESS: 0
SUM OF ALL RESPONSES TO PHQ QUESTIONS 1-9: 0
1. LITTLE INTEREST OR PLEASURE IN DOING THINGS: NOT AT ALL
2. FEELING DOWN, DEPRESSED OR HOPELESS: NOT AT ALL
SUM OF ALL RESPONSES TO PHQ9 QUESTIONS 1 & 2: 0
SUM OF ALL RESPONSES TO PHQ QUESTIONS 1-9: 0

## 2024-01-18 NOTE — PATIENT INSTRUCTIONS
Patient Education        Week 39 of Your Pregnancy: Care Instructions    [place] babies can look different than what you see in pictures or movies. Their heads can be a strange shape right after birth. And they may have swollen eyes and red marks on their faces.   You can still get pregnant even if you are breastfeeding. If you don't want to get pregnant, talk to your doctor about birth control.   Tips for week 39 of pregnancy  If you plan to breastfeed, get prepared.     Continue to eat healthy foods.  Keep taking your prenatal vitamins during breastfeeding if your doctor recommends it.  Talk to your doctor before taking any medicines or supplements.  Choose the right birth control for you.     Intrauterine devices (IUDs) are placed in the uterus. Sometimes the IUD can be placed right after giving birth. They work for years.  Hormonal implants are placed under the skin of the arm. They also work for years.  Depo-Provera is a shot. You get it every 3 months.  Birth control pills can be used. They're taken every day.  Tubal ligation (tying your tubes) and vasectomy are surgeries. They're permanent.  Diaphragms, spermicide, and condoms must be used each time you have sex. If you used a diaphragm before, you should get refitted after the baby is born.  A birth control patch or ring can be used. These just can't be started until several weeks after you give birth.  Follow-up care is a key part of your treatment and safety. Be sure to make and go to all appointments, and call your doctor if you are having problems. It's also a good idea to know your test results and keep a list of the medicines you take.  Where can you learn more?  Go to https://www.[employer].net/patientEd and enter A811 to learn more about \"Week 39 of Your Pregnancy: Care Instructions.\"  Current as of: 2023               Content Version: 13.9  © 6316-8001 [employer].   Care instructions adapted under license by [employer]. If

## 2024-01-19 ENCOUNTER — ROUTINE PRENATAL (OUTPATIENT)
Age: 29
End: 2024-01-19

## 2024-01-19 VITALS — DIASTOLIC BLOOD PRESSURE: 73 MMHG | WEIGHT: 158 LBS | SYSTOLIC BLOOD PRESSURE: 110 MMHG | BODY MASS INDEX: 27.12 KG/M2

## 2024-01-19 DIAGNOSIS — Z34.00 SUPERVISION OF NORMAL FIRST PREGNANCY, ANTEPARTUM: Primary | ICD-10-CM

## 2024-01-19 PROCEDURE — 0502F SUBSEQUENT PRENATAL CARE: CPT | Performed by: OBSTETRICS & GYNECOLOGY

## 2024-01-23 ENCOUNTER — ANESTHESIA (OUTPATIENT)
Facility: HOSPITAL | Age: 29
End: 2024-01-23
Payer: COMMERCIAL

## 2024-01-23 ENCOUNTER — HOSPITAL ENCOUNTER (INPATIENT)
Facility: HOSPITAL | Age: 29
LOS: 2 days | Discharge: HOME | End: 2024-01-25
Attending: OBSTETRICS & GYNECOLOGY | Admitting: OBSTETRICS & GYNECOLOGY
Payer: COMMERCIAL

## 2024-01-23 ENCOUNTER — ANESTHESIA EVENT (OUTPATIENT)
Facility: HOSPITAL | Age: 29
End: 2024-01-23
Payer: COMMERCIAL

## 2024-01-23 DIAGNOSIS — G89.18 POSTOPERATIVE PAIN: Primary | ICD-10-CM

## 2024-01-23 LAB
ABO + RH BLD: NORMAL
BLOOD GROUP ANTIBODIES SERPL: NORMAL
ERYTHROCYTE [DISTWIDTH] IN BLOOD BY AUTOMATED COUNT: 13.2 % (ref 11.5–14.5)
HCT VFR BLD AUTO: 34.2 % (ref 35–47)
HGB BLD-MCNC: 11.3 G/DL (ref 11.5–16)
MCH RBC QN AUTO: 27.6 PG (ref 26–34)
MCHC RBC AUTO-ENTMCNC: 33 G/DL (ref 30–36.5)
MCV RBC AUTO: 83.6 FL (ref 80–99)
NRBC # BLD: 0 K/UL (ref 0–0.01)
NRBC BLD-RTO: 0 PER 100 WBC
PLATELET # BLD AUTO: 285 K/UL (ref 150–400)
PMV BLD AUTO: 9.7 FL (ref 8.9–12.9)
RBC # BLD AUTO: 4.09 M/UL (ref 3.8–5.2)
SPECIMEN EXP DATE BLD: NORMAL
WBC # BLD AUTO: 13 K/UL (ref 3.6–11)

## 2024-01-23 PROCEDURE — 59510 CESAREAN DELIVERY: CPT | Performed by: OBSTETRICS & GYNECOLOGY

## 2024-01-23 PROCEDURE — 3700000000 HC ANESTHESIA ATTENDED CARE: Performed by: OBSTETRICS & GYNECOLOGY

## 2024-01-23 PROCEDURE — 6370000000 HC RX 637 (ALT 250 FOR IP): Performed by: OBSTETRICS & GYNECOLOGY

## 2024-01-23 PROCEDURE — 86850 RBC ANTIBODY SCREEN: CPT

## 2024-01-23 PROCEDURE — 85027 COMPLETE CBC AUTOMATED: CPT

## 2024-01-23 PROCEDURE — 7100000001 HC PACU RECOVERY - ADDTL 15 MIN: Performed by: OBSTETRICS & GYNECOLOGY

## 2024-01-23 PROCEDURE — 6360000002 HC RX W HCPCS: Performed by: ANESTHESIOLOGY

## 2024-01-23 PROCEDURE — 4A1HXCZ MONITORING OF PRODUCTS OF CONCEPTION, CARDIAC RATE, EXTERNAL APPROACH: ICD-10-PCS | Performed by: OBSTETRICS & GYNECOLOGY

## 2024-01-23 PROCEDURE — 6370000000 HC RX 637 (ALT 250 FOR IP): Performed by: ANESTHESIOLOGY

## 2024-01-23 PROCEDURE — 6360000002 HC RX W HCPCS: Performed by: NURSE ANESTHETIST, CERTIFIED REGISTERED

## 2024-01-23 PROCEDURE — 2500000003 HC RX 250 WO HCPCS: Performed by: NURSE ANESTHETIST, CERTIFIED REGISTERED

## 2024-01-23 PROCEDURE — 3700000001 HC ADD 15 MINUTES (ANESTHESIA): Performed by: OBSTETRICS & GYNECOLOGY

## 2024-01-23 PROCEDURE — 3609079900 HC CESAREAN SECTION: Performed by: OBSTETRICS & GYNECOLOGY

## 2024-01-23 PROCEDURE — 6360000002 HC RX W HCPCS: Performed by: OBSTETRICS & GYNECOLOGY

## 2024-01-23 PROCEDURE — 2580000003 HC RX 258: Performed by: NURSE ANESTHETIST, CERTIFIED REGISTERED

## 2024-01-23 PROCEDURE — 86901 BLOOD TYPING SEROLOGIC RH(D): CPT

## 2024-01-23 PROCEDURE — 86900 BLOOD TYPING SEROLOGIC ABO: CPT

## 2024-01-23 PROCEDURE — 2580000003 HC RX 258: Performed by: OBSTETRICS & GYNECOLOGY

## 2024-01-23 PROCEDURE — 7100000000 HC PACU RECOVERY - FIRST 15 MIN: Performed by: OBSTETRICS & GYNECOLOGY

## 2024-01-23 PROCEDURE — 1120000000 HC RM PRIVATE OB

## 2024-01-23 PROCEDURE — 94761 N-INVAS EAR/PLS OXIMETRY MLT: CPT

## 2024-01-23 PROCEDURE — 36415 COLL VENOUS BLD VENIPUNCTURE: CPT

## 2024-01-23 PROCEDURE — 88305 TISSUE EXAM BY PATHOLOGIST: CPT

## 2024-01-23 PROCEDURE — 86780 TREPONEMA PALLIDUM: CPT

## 2024-01-23 PROCEDURE — 2709999900 HC NON-CHARGEABLE SUPPLY: Performed by: OBSTETRICS & GYNECOLOGY

## 2024-01-23 RX ORDER — OXYCODONE HYDROCHLORIDE 5 MG/1
10 TABLET ORAL EVERY 4 HOURS PRN
Status: ACTIVE | OUTPATIENT
Start: 2024-01-23 | End: 2024-01-24

## 2024-01-23 RX ORDER — BUPIVACAINE HYDROCHLORIDE 7.5 MG/ML
INJECTION, SOLUTION INTRASPINAL PRN
Status: DISCONTINUED | OUTPATIENT
Start: 2024-01-23 | End: 2024-01-23 | Stop reason: SDUPTHER

## 2024-01-23 RX ORDER — SODIUM CHLORIDE 0.9 % (FLUSH) 0.9 %
5-40 SYRINGE (ML) INJECTION EVERY 12 HOURS SCHEDULED
Status: DISCONTINUED | OUTPATIENT
Start: 2024-01-23 | End: 2024-01-25 | Stop reason: HOSPADM

## 2024-01-23 RX ORDER — OXYCODONE HYDROCHLORIDE AND ACETAMINOPHEN 5; 325 MG/1; MG/1
1 TABLET ORAL EVERY 4 HOURS PRN
Status: DISCONTINUED | OUTPATIENT
Start: 2024-01-23 | End: 2024-01-25 | Stop reason: HOSPADM

## 2024-01-23 RX ORDER — LANOLIN/MINERAL OIL
LOTION (ML) TOPICAL
Status: DISCONTINUED | OUTPATIENT
Start: 2024-01-23 | End: 2024-01-25 | Stop reason: HOSPADM

## 2024-01-23 RX ORDER — ACETAMINOPHEN 500 MG
1000 TABLET ORAL EVERY 8 HOURS PRN
Status: DISCONTINUED | OUTPATIENT
Start: 2024-01-23 | End: 2024-01-25 | Stop reason: HOSPADM

## 2024-01-23 RX ORDER — ONDANSETRON 2 MG/ML
4 INJECTION INTRAMUSCULAR; INTRAVENOUS EVERY 6 HOURS PRN
Status: DISCONTINUED | OUTPATIENT
Start: 2024-01-23 | End: 2024-01-25 | Stop reason: HOSPADM

## 2024-01-23 RX ORDER — OXYTOCIN 10 [USP'U]/ML
INJECTION, SOLUTION INTRAMUSCULAR; INTRAVENOUS PRN
Status: DISCONTINUED | OUTPATIENT
Start: 2024-01-23 | End: 2024-01-23 | Stop reason: SDUPTHER

## 2024-01-23 RX ORDER — SODIUM CHLORIDE, SODIUM LACTATE, POTASSIUM CHLORIDE, CALCIUM CHLORIDE 600; 310; 30; 20 MG/100ML; MG/100ML; MG/100ML; MG/100ML
INJECTION, SOLUTION INTRAVENOUS CONTINUOUS
Status: DISCONTINUED | OUTPATIENT
Start: 2024-01-23 | End: 2024-01-25 | Stop reason: HOSPADM

## 2024-01-23 RX ORDER — ONDANSETRON 4 MG/1
4 TABLET, ORALLY DISINTEGRATING ORAL EVERY 8 HOURS PRN
Status: DISCONTINUED | OUTPATIENT
Start: 2024-01-23 | End: 2024-01-25 | Stop reason: HOSPADM

## 2024-01-23 RX ORDER — OXYCODONE AND ACETAMINOPHEN 10; 325 MG/1; MG/1
1 TABLET ORAL EVERY 4 HOURS PRN
Status: DISCONTINUED | OUTPATIENT
Start: 2024-01-23 | End: 2024-01-25 | Stop reason: HOSPADM

## 2024-01-23 RX ORDER — IBUPROFEN 800 MG/1
800 TABLET ORAL EVERY 8 HOURS SCHEDULED
Status: DISCONTINUED | OUTPATIENT
Start: 2024-01-24 | End: 2024-01-25 | Stop reason: HOSPADM

## 2024-01-23 RX ORDER — IBUPROFEN 800 MG/1
800 TABLET ORAL EVERY 8 HOURS PRN
Status: DISCONTINUED | OUTPATIENT
Start: 2024-01-23 | End: 2024-01-25 | Stop reason: HOSPADM

## 2024-01-23 RX ORDER — FERROUS SULFATE 325(65) MG
325 TABLET ORAL 2 TIMES DAILY WITH MEALS
Status: DISCONTINUED | OUTPATIENT
Start: 2024-01-23 | End: 2024-01-25 | Stop reason: HOSPADM

## 2024-01-23 RX ORDER — SODIUM CHLORIDE, SODIUM LACTATE, POTASSIUM CHLORIDE, AND CALCIUM CHLORIDE .6; .31; .03; .02 G/100ML; G/100ML; G/100ML; G/100ML
1000 INJECTION, SOLUTION INTRAVENOUS ONCE
Status: DISCONTINUED | OUTPATIENT
Start: 2024-01-23 | End: 2024-01-25 | Stop reason: HOSPADM

## 2024-01-23 RX ORDER — OXYCODONE HYDROCHLORIDE 5 MG/1
5 TABLET ORAL EVERY 4 HOURS PRN
Status: DISPENSED | OUTPATIENT
Start: 2024-01-23 | End: 2024-01-24

## 2024-01-23 RX ORDER — DOCUSATE SODIUM 100 MG/1
100 CAPSULE, LIQUID FILLED ORAL 2 TIMES DAILY
Status: DISCONTINUED | OUTPATIENT
Start: 2024-01-23 | End: 2024-01-25 | Stop reason: HOSPADM

## 2024-01-23 RX ORDER — NALOXONE HYDROCHLORIDE 0.4 MG/ML
INJECTION, SOLUTION INTRAMUSCULAR; INTRAVENOUS; SUBCUTANEOUS PRN
Status: ACTIVE | OUTPATIENT
Start: 2024-01-23 | End: 2024-01-24

## 2024-01-23 RX ORDER — SODIUM CHLORIDE 0.9 % (FLUSH) 0.9 %
5-40 SYRINGE (ML) INJECTION PRN
Status: DISCONTINUED | OUTPATIENT
Start: 2024-01-23 | End: 2024-01-25 | Stop reason: HOSPADM

## 2024-01-23 RX ORDER — MORPHINE SULFATE 1 MG/ML
INJECTION, SOLUTION EPIDURAL; INTRATHECAL; INTRAVENOUS PRN
Status: DISCONTINUED | OUTPATIENT
Start: 2024-01-23 | End: 2024-01-23 | Stop reason: SDUPTHER

## 2024-01-23 RX ORDER — ONDANSETRON 2 MG/ML
INJECTION INTRAMUSCULAR; INTRAVENOUS PRN
Status: DISCONTINUED | OUTPATIENT
Start: 2024-01-23 | End: 2024-01-23 | Stop reason: SDUPTHER

## 2024-01-23 RX ORDER — OXYCODONE HYDROCHLORIDE AND ACETAMINOPHEN 5; 325 MG/1; MG/1
1 TABLET ORAL EVERY 4 HOURS PRN
Qty: 20 TABLET | Refills: 0 | Status: SHIPPED | OUTPATIENT
Start: 2024-01-23 | End: 2024-01-26

## 2024-01-23 RX ORDER — SWAB
1 SWAB, NON-MEDICATED MISCELLANEOUS DAILY
Status: DISCONTINUED | OUTPATIENT
Start: 2024-01-23 | End: 2024-01-25 | Stop reason: HOSPADM

## 2024-01-23 RX ORDER — FAMOTIDINE 10 MG/ML
INJECTION, SOLUTION INTRAVENOUS PRN
Status: DISCONTINUED | OUTPATIENT
Start: 2024-01-23 | End: 2024-01-23 | Stop reason: SDUPTHER

## 2024-01-23 RX ORDER — SODIUM CHLORIDE 9 MG/ML
INJECTION, SOLUTION INTRAVENOUS PRN
Status: DISCONTINUED | OUTPATIENT
Start: 2024-01-23 | End: 2024-01-25 | Stop reason: HOSPADM

## 2024-01-23 RX ORDER — KETOROLAC TROMETHAMINE 30 MG/ML
30 INJECTION, SOLUTION INTRAMUSCULAR; INTRAVENOUS EVERY 6 HOURS
Status: COMPLETED | OUTPATIENT
Start: 2024-01-23 | End: 2024-01-24

## 2024-01-23 RX ORDER — IBUPROFEN 800 MG/1
800 TABLET ORAL EVERY 8 HOURS SCHEDULED
Qty: 60 TABLET | Refills: 0 | Status: SHIPPED | OUTPATIENT
Start: 2024-01-23

## 2024-01-23 RX ORDER — DEXAMETHASONE SODIUM PHOSPHATE 4 MG/ML
INJECTION, SOLUTION INTRA-ARTICULAR; INTRALESIONAL; INTRAMUSCULAR; INTRAVENOUS; SOFT TISSUE PRN
Status: DISCONTINUED | OUTPATIENT
Start: 2024-01-23 | End: 2024-01-23 | Stop reason: SDUPTHER

## 2024-01-23 RX ORDER — SODIUM CHLORIDE, SODIUM LACTATE, POTASSIUM CHLORIDE, CALCIUM CHLORIDE 600; 310; 30; 20 MG/100ML; MG/100ML; MG/100ML; MG/100ML
INJECTION, SOLUTION INTRAVENOUS CONTINUOUS PRN
Status: DISCONTINUED | OUTPATIENT
Start: 2024-01-23 | End: 2024-01-23 | Stop reason: SDUPTHER

## 2024-01-23 RX ADMIN — OXYCODONE 5 MG: 5 TABLET ORAL at 13:33

## 2024-01-23 RX ADMIN — DEXAMETHASONE SODIUM PHOSPHATE 4 MG: 4 INJECTION INTRA-ARTICULAR; INTRALESIONAL; INTRAMUSCULAR; INTRAVENOUS; SOFT TISSUE at 08:05

## 2024-01-23 RX ADMIN — BUPIVACAINE HYDROCHLORIDE IN DEXTROSE 1.4 ML: 7.5 INJECTION, SOLUTION SUBARACHNOID at 07:48

## 2024-01-23 RX ADMIN — SODIUM CHLORIDE, POTASSIUM CHLORIDE, SODIUM LACTATE AND CALCIUM CHLORIDE: 600; 310; 30; 20 INJECTION, SOLUTION INTRAVENOUS at 07:42

## 2024-01-23 RX ADMIN — KETOROLAC TROMETHAMINE 30 MG: 30 INJECTION, SOLUTION INTRAMUSCULAR; INTRAVENOUS at 15:37

## 2024-01-23 RX ADMIN — SODIUM CHLORIDE, POTASSIUM CHLORIDE, SODIUM LACTATE AND CALCIUM CHLORIDE: 600; 310; 30; 20 INJECTION, SOLUTION INTRAVENOUS at 10:05

## 2024-01-23 RX ADMIN — KETOROLAC TROMETHAMINE 30 MG: 30 INJECTION, SOLUTION INTRAMUSCULAR; INTRAVENOUS at 09:00

## 2024-01-23 RX ADMIN — CEFAZOLIN SODIUM 2000 MG: 1 POWDER, FOR SOLUTION INTRAMUSCULAR; INTRAVENOUS at 07:52

## 2024-01-23 RX ADMIN — FAMOTIDINE 20 MG: 10 INJECTION, SOLUTION INTRAVENOUS at 07:44

## 2024-01-23 RX ADMIN — SODIUM CHLORIDE, POTASSIUM CHLORIDE, SODIUM LACTATE AND CALCIUM CHLORIDE: 600; 310; 30; 20 INJECTION, SOLUTION INTRAVENOUS at 11:24

## 2024-01-23 RX ADMIN — KETOROLAC TROMETHAMINE 30 MG: 30 INJECTION, SOLUTION INTRAMUSCULAR; INTRAVENOUS at 23:17

## 2024-01-23 RX ADMIN — MORPHINE SULFATE 0.2 MG: 1 INJECTION, SOLUTION EPIDURAL; INTRATHECAL; INTRAVENOUS at 07:48

## 2024-01-23 RX ADMIN — DOCUSATE SODIUM 100 MG: 100 CAPSULE, LIQUID FILLED ORAL at 23:17

## 2024-01-23 RX ADMIN — FERROUS SULFATE TAB 325 MG (65 MG ELEMENTAL FE) 325 MG: 325 (65 FE) TAB at 15:37

## 2024-01-23 RX ADMIN — SODIUM CHLORIDE, SODIUM LACTATE, POTASSIUM CHLORIDE, AND CALCIUM CHLORIDE 1000 ML: .6; .31; .03; .02 INJECTION, SOLUTION INTRAVENOUS at 07:11

## 2024-01-23 RX ADMIN — ONDANSETRON 4 MG: 2 INJECTION INTRAMUSCULAR; INTRAVENOUS at 07:44

## 2024-01-23 RX ADMIN — OXYTOCIN 30 UNITS: 10 INJECTION, SOLUTION INTRAMUSCULAR; INTRAVENOUS at 08:05

## 2024-01-23 RX ADMIN — PHENYLEPHRINE HYDROCHLORIDE 30 MCG/MIN: 10 INJECTION INTRAVENOUS at 07:49

## 2024-01-23 ASSESSMENT — PAIN SCALES - GENERAL
PAINLEVEL_OUTOF10: 4
PAINLEVEL_OUTOF10: 3

## 2024-01-23 ASSESSMENT — PAIN DESCRIPTION - ORIENTATION
ORIENTATION: LOWER;MID
ORIENTATION: LOWER
ORIENTATION: LOWER;MID

## 2024-01-23 ASSESSMENT — PAIN DESCRIPTION - DESCRIPTORS
DESCRIPTORS: ACHING;CRAMPING;SORE
DESCRIPTORS: BURNING;SORE
DESCRIPTORS: ACHING;SORE;BURNING
DESCRIPTORS: DISCOMFORT

## 2024-01-23 ASSESSMENT — PAIN DESCRIPTION - LOCATION
LOCATION: ABDOMEN
LOCATION: ABDOMEN
LOCATION: ABDOMEN;INCISION
LOCATION: ABDOMEN

## 2024-01-23 NOTE — DISCHARGE INSTRUCTIONS
POST DELIVERY DISCHARGE INSTRUCTIONS    Name: Maribeth Vaughan  YOB: 1995  Primary Diagnosis: [unfilled]    General:     Diet/Diet Restrictions:  Eight 8-ounce glasses of fluid daily (water, juices); avoid excessive caffeine intake.  Meals/snacks as desired which are high in fiber and carbohydrates and low in fat and cholesterol.    Medications:   {Medication reconciliation information is now added to the patient's AVS automatically when it is printed.  There is no need to use this SmartLink in discharge instructions.  Highlight this text and delete it to clear this message}      Physical Activity / Restrictions / Safety:     Avoid heavy lifting, no more that 8 lbs. For 2-3 weeks; No driving while taking narcotic pain medication. Post  patients should not drive until pain free.  No intercourse 4-6 weeks, no douching or tampon use. May resume exercise in 6 weeks.         Discharge Instructions/Special Treatment/Home Care Needs:     Continue prenatal vitamins.  Continue to use squirt bottle with warm water on your episiotomy after each bathroom use until bleeding stops.  If steri-strips applied to your incision, remove in 7 days.  Take stool softeners daily.    Call your doctor for the following:     Fever over 101 degrees by mouth.  Vaginal bleeding heavier than a normal menstrual period or lost larger than a golf ball.  Red streaks or increased swelling of legs, painful red streaks on your breast.  Painful urination, or increased pain, redness or discharge with your incision.    Pain Management:     Pain Management:   Take Acetaminophen (Tylenol) or Ibuprofen (Advil, Motrin), as directed for pain. Use a warm Sitz bath 3 times daily to relieve episiotomy or hemorrhoidal discomfort. Heating pad to  incision as needed. For hemorrhoidal discomfort, use Tucks and Anusol cream as needed and directed.    Follow-Up Care:     Pt to scheduled follow-up appt in 6 weeks    Telephone number:

## 2024-01-23 NOTE — LACTATION NOTE
This note was copied from a baby's chart.  This is mother's first baby. Mother states baby has been latching on and breastfeeding well.     Discussed with mother her plan for feeding.  Reviewed the benefits of exclusive breast milk feeding during the hospital stay.   Informed her of the risks of using formula to supplement in the first few days of life as well as the benefits of successful breast milk feeding; referred her to the Breastfeeding booklet about this information.   She acknowledges understanding of information reviewed and states that it is her plan to breastfeed her infant.  Will support her choice and offer additional information as needed.       Encouraged mom to attempt feeding with baby led feeding cues. Just as sucking on fingers, rooting, mouthing.   Looking for 8-12 feedings in 24 hours.   Don't limit baby at breast, allow baby to come of breast on it's own. Baby may want to feed  often and may increase number of feedings on second day of life. Skin to skin encouraged.      If baby doesn't nurse,  Mom should  hand express  10-20 drops of colostrum and drip into baby's mouth, or give to baby by finger feeding, cup feeding, or spoon feeding at least every 2-3 hours.     Mother  will successfully establish breastfeeding by feeding in response to early feeding cues   or wake every 3h, will obtain deep latch, and will keep log of feedings/output.  Taught to BF at hunger cues and or q 2-3 hrs and to offer 10-20 drops of hand expressed colostrum at any non-feeds.      Left Breast: Soft  Left Nipple: Protrude  Right Nipple: Protrude  Right Breast: Soft                      Breast Care: Nursing pads, Lanolin provided     Lactation Comment: Baby last breast fed at 1400 for 35 minutes. Encouraged mother to call  for breastfeeding assistance.

## 2024-01-23 NOTE — ANESTHESIA PRE PROCEDURE
Department of Anesthesiology  Preprocedure Note       Name:  Maribeth Vaughan   Age:  28 y.o.  :  1995                                          MRN:  [id number]         Date:  2024      Surgeon: Surgeon(s):  Yesenia Navarro MD    Procedure: Procedure(s):   SECTION    Medications prior to admission:   Prior to Admission medications    Medication Sig Start Date End Date Taking? Authorizing Provider   Prenatal Vit-Fe Fumarate-FA (PRENATAL 1+1 PO) Take by mouth daily    Provider, MD Paula       Current medications:    Current Facility-Administered Medications   Medication Dose Route Frequency Provider Last Rate Last Admin   • lactated ringers IV soln infusion   IntraVENous Continuous Yesenia Navarro MD       • lactated ringers bolus bolus 1,000 mL  1,000 mL IntraVENous Once Yesenia Navarro MD       • ceFAZolin (ANCEF) 2,000 mg in sterile water 20 mL IV syringe  2,000 mg IntraVENous Once Yesenia Navarro MD           Allergies:  No Known Allergies    Problem List:    Patient Active Problem List   Diagnosis Code   • Supervision of normal first pregnancy Z34.00   • Back pain affecting pregnancy in third trimester O99.891, M54.9   • 31 weeks gestation of pregnancy Z3A.31   • Pyelonephritis affecting pregnancy in third trimester O23.03   • Hydronephrosis, bilateral N13.30   • Calculus of kidney affecting pregnancy in third trimester O26.833, N20.0   • Delivery of pregnancy by  section O82       Past Medical History:  History reviewed. No pertinent past medical history.    Past Surgical History:        Procedure Laterality Date   • WISDOM TOOTH EXTRACTION         Social History:    Social History     Tobacco Use   • Smoking status: Never   • Smokeless tobacco: Never   Substance Use Topics   • Alcohol use: Yes                                Counseling given: Not Answered      Vital Signs (Current):   Vitals:    24 0632 24 0637 24 0642 24 0647   BP:

## 2024-01-23 NOTE — ANESTHESIA PROCEDURE NOTES
Spinal Block    Patient location during procedure: OB  End time: 1/23/2024 7:48 AM  Reason for block: post-op pain management, primary anesthetic and at surgeon's request  Staffing  Performed: resident/CRNA   Anesthesiologist: Bob Elizabeth MD  Resident/CRNA: Pamela Shah APRN - CRNA  Performed by: Pamela Shah APRN - CRNA  Authorized by: Bob Elizabeth MD    Spinal Block  Patient position: sitting  Prep: DuraPrep  Patient monitoring: cardiac monitor, continuous pulse ox, continuous capnometry, frequent blood pressure checks and oxygen  Approach: midline  Location: L3/L4  Provider prep: mask and sterile gloves  Local infiltration: lidocaine  Needle  Needle type: Pencan   Needle gauge: 25 G  Needle length: 3.5 in  Assessment  Swirl obtained: Yes  CSF: clear  Attempts: 1  Hemodynamics: stable  Preanesthetic Checklist  Completed: patient identified, IV checked, site marked, risks and benefits discussed, surgical/procedural consents, pre-op evaluation, timeout performed, anesthesia consent given, oxygen available and monitors applied/VS acknowledged

## 2024-01-23 NOTE — DISCHARGE SUMMARY
Obstetrical Discharge Summary     Name: Maribeth Vaughan MRN: [id number]  SSN: [id number]    YOB: 1995  Age: 28 y.o.  Sex: female      Admit Date: 2024    Discharge Date: 2024    Admitting Physician: Yesenia Navarro MD     Attending Physician:  Yesenia Navarro MD     * Admission Diagnoses: Delivery of pregnancy by  section [O82]    * Discharge Diagnoses:   Information for the patient's :  Lopez Vaughan [[id number]]   , Low Transverse      Additional Diagnoses:    Lab Results   Component Value Date/Time    ABORH O POSITIVE 2024 06:10 AM      Immunization History   Administered Date(s) Administered    Influenza, FLUCELVAX, (age 6 mo+), MDCK, PF, 0.5mL 10/06/2023    TDaP, ADACEL (age 10y-64y), BOOSTRIX (age 10y+), IM, 0.5mL 2023       * Procedures:   Delivery Type: , Low Transverse      Delivering Clinician:YESENIA NAVARRO    Delivery Date /Time: 2024  8:03 AM  '          * Discharge Condition: stable    * Hospital Course: Normal hospital course following the delivery.    * Disposition: home with office follow-up    Discharge Medications:      Medication List        ASK your doctor about these medications      PRENATAL 1+1 PO              * Follow-up Care/Patient Instructions:  Activity: activity as tolerated  Diet: general  Wound Care: as directed    Patient to schedule follow-up in 6 weeks    Signed By:  Yesenia Navarro MD     2024

## 2024-01-23 NOTE — OP NOTE
Section Delivery Procedure Note    Name: Maribeth Vaughan   Medical Record Number: [id number]   YOB: 1995  Today's Date: 2024      Preoperative Diagnosis: Breech    Postoperative Diagnosis: * No post-op diagnosis entered *    Procedure: Low Transverse  Section    Surgeon(s):  Yesenia Navarro MD    Surgical Assistants:   Circulator: Alex Bello RN  Scrub Person First: Francisca Hankins Maria  Circulator Assist: Caden Esqueda RN    Anesthesia: Spinal    Prophylactic Antibiotics: Ancef         Fetal Description: weston female, breech presentation    Birth Information:   Information for the patient's :  Lopez Vaughan [[id number]]        Umbilical Cord: 3 vessels present    Placenta:  manual removal    Specimens:   ID Type Source Tests Collected by Time Destination   A : Peritoneal Mass Tissue Abdomen SURGICAL PATHOLOGY Yesenia Navarro MD 2024 0807         Implants: * No implants in log *    EBL: 800cc           Complications:  none      Procedure Detail:      After proper patient identification and consent, the patient was taken to the operating room, where spinal anesthesia was administered and found to be adequate. Cornejo catheter had been placed using sterile technique.  The patient was prepped and draped in the normal sterile fashion.The abdomen was entered using the Pfannenstiel technique. The peritoneum was entered bluntely well superior to the bladder without any apparent injury. A 1 cm mass was attached to a filmy part of the peritoneum and easily .  It was sent to pathology.  Excellent hemostasis was noted. An Aron retractor was placed.  Palpation revealed no bowel below the retractor. The bladder flap was created without difficulty. A low transverse uterine incision was made with the scalpel and extended with blunt finger dissection. Amniotomy was performed and the fluid was medium amount clear.   The

## 2024-01-23 NOTE — PROGRESS NOTES
0533- Pt arrives to the unit ambulatory and stable for scheduled  with MD Navarro.    0728-  MD Navarro at bedside consenting pt for procedure. All questions answered and pt educated.    0742- Pt in OR for scheduled . Please see  summary for further details.    0834- Pt back in room after scheduled  alert and stable. Bilateral SCD's placed on pts lower legs. This RN at bedside     1100- TRANSFER - OUT REPORT:    Verbal report given to Yamila RN on Maribeth Vaughan  being transferred to MIU for routine progression of patient care       Report consisted of patient's Situation, Background, Assessment and   Recommendations(SBAR).     Information from the following report(s) Nurse Handoff Report, Adult Overview, Intake/Output, MAR, and Recent Results was reviewed with the receiving nurse.           Lines:   Peripheral IV 24 Left;Proximal Forearm (Active)   Site Assessment Clean, dry & intact 24 0654   Line Status Blood return noted 24 0654   Phlebitis Assessment No symptoms 24 0654   Infiltration Assessment 0 24 0654   Dressing Status Clean, dry & intact 24 0654        Opportunity for questions and clarification was provided.      Patient transported with:  Registered Nurse

## 2024-01-23 NOTE — ANESTHESIA POSTPROCEDURE EVALUATION
Department of Anesthesiology  Postprocedure Note    Patient: Maribeth Vaughan  MRN: [id number]  YOB: 1995  Date of evaluation: 2024    Procedure Summary       Date: 24 Room / Location: [facility] L&D 02 / [facility] L[facility]D OR    Anesthesia Start: 742 Anesthesia Stop: 838    Procedure:  SECTION (Abdomen) Diagnosis:       Breech presentation, single or unspecified fetus      (Breech)    Surgeons: Yesenia Navarro MD Responsible Provider: Bob Elizabeth MD    Anesthesia Type: Spinal ASA Status: 2            Anesthesia Type: Spinal    Tylor Phase I: Tylor Score: 9    Tylor Phase II:      Anesthesia Post Evaluation    No notable events documented.

## 2024-01-23 NOTE — H&P
History & Physical    Name: Maribeth Vaughan MRN: [id number]  SSN: [id number]    YOB: 1995  Age: 28 y.o.  Sex: female        Subjective:     Estimated Date of Delivery: 24  OB History          1    Para        Term                AB        Living             SAB        IAB        Ectopic        Molar        Multiple        Live Births                    Ms. Vaughan is admitted with pregnancy at 39w3d for  Section for Breech  Prenatal course was complicated by   Alpha thal carrier- fob neg  20 week ultrasound with sub optimal views will repeat at 24 weeks.   Flu vaccine given 10/6/23  PNL otherwise wnl incl msafp and NIPS  Pyelo and kidney stones at 30 weeks  Breech at 34 weeks. Please see prenatal records for details.    History reviewed. No pertinent past medical history.  Past Surgical History:   Procedure Laterality Date    WISDOM TOOTH EXTRACTION       Social History     Occupational History    Not on file   Tobacco Use    Smoking status: Never    Smokeless tobacco: Never   Substance and Sexual Activity    Alcohol use: Yes    Drug use: Never    Sexual activity: Not on file     Family History   Problem Relation Age of Onset    No Known Problems Mother        No Known Allergies  Prior to Admission medications    Medication Sig Start Date End Date Taking? Authorizing Provider   Prenatal Vit-Fe Fumarate-FA (PRENATAL 1+1 PO) Take by mouth daily    Provider, MD Paula        Review of Systems: Pertinent items are noted in HPI.    Objective:     Vitals:  Vitals:    24 0632 24 0637 24 0642 24 0647   BP:       Pulse:       Resp:       SpO2: 98% 97% 99% 100%        Physical Exam:  CV: RRR  Resp:- CTAB  Abd- Soft, NT  Membranes:  Intact  Fetal Heart Rate: Reactive        Assessment/Plan:     28 y.o.   at 39w3d    Plan: Admit for C/S for breech presentation. Reassuring fetal status.  Group B Strep was negative. She is anemic.    Signed By:  Yesenia

## 2024-01-24 LAB
ERYTHROCYTE [DISTWIDTH] IN BLOOD BY AUTOMATED COUNT: 13 % (ref 11.5–14.5)
HCT VFR BLD AUTO: 30.6 % (ref 35–47)
HGB BLD-MCNC: 10.1 G/DL (ref 11.5–16)
MCH RBC QN AUTO: 27.4 PG (ref 26–34)
MCHC RBC AUTO-ENTMCNC: 33 G/DL (ref 30–36.5)
MCV RBC AUTO: 83.2 FL (ref 80–99)
NRBC # BLD: 0 K/UL (ref 0–0.01)
NRBC BLD-RTO: 0 PER 100 WBC
PLATELET # BLD AUTO: 265 K/UL (ref 150–400)
PMV BLD AUTO: 9.9 FL (ref 8.9–12.9)
RBC # BLD AUTO: 3.68 M/UL (ref 3.8–5.2)
T PALLIDUM AB SER QL IA: NON REACTIVE
WBC # BLD AUTO: 17 K/UL (ref 3.6–11)

## 2024-01-24 PROCEDURE — 94761 N-INVAS EAR/PLS OXIMETRY MLT: CPT

## 2024-01-24 PROCEDURE — 85027 COMPLETE CBC AUTOMATED: CPT

## 2024-01-24 PROCEDURE — 1120000000 HC RM PRIVATE OB

## 2024-01-24 PROCEDURE — 6360000002 HC RX W HCPCS: Performed by: ANESTHESIOLOGY

## 2024-01-24 PROCEDURE — 6370000000 HC RX 637 (ALT 250 FOR IP): Performed by: OBSTETRICS & GYNECOLOGY

## 2024-01-24 PROCEDURE — 36415 COLL VENOUS BLD VENIPUNCTURE: CPT

## 2024-01-24 RX ADMIN — Medication 1 TABLET: at 08:08

## 2024-01-24 RX ADMIN — DOCUSATE SODIUM 100 MG: 100 CAPSULE, LIQUID FILLED ORAL at 21:42

## 2024-01-24 RX ADMIN — KETOROLAC TROMETHAMINE 30 MG: 30 INJECTION, SOLUTION INTRAMUSCULAR; INTRAVENOUS at 06:47

## 2024-01-24 RX ADMIN — DOCUSATE SODIUM 100 MG: 100 CAPSULE, LIQUID FILLED ORAL at 08:08

## 2024-01-24 RX ADMIN — IBUPROFEN 800 MG: 800 TABLET, FILM COATED ORAL at 15:27

## 2024-01-24 ASSESSMENT — PAIN DESCRIPTION - ORIENTATION: ORIENTATION: LOWER

## 2024-01-24 ASSESSMENT — PAIN - FUNCTIONAL ASSESSMENT: PAIN_FUNCTIONAL_ASSESSMENT: ACTIVITIES ARE NOT PREVENTED

## 2024-01-24 ASSESSMENT — PAIN DESCRIPTION - DESCRIPTORS: DESCRIPTORS: SORE

## 2024-01-24 ASSESSMENT — PAIN SCALES - GENERAL
PAINLEVEL_OUTOF10: 3
PAINLEVEL_OUTOF10: 0

## 2024-01-24 ASSESSMENT — PAIN DESCRIPTION - LOCATION: LOCATION: ABDOMEN

## 2024-01-24 NOTE — LACTATION NOTE
This note was copied from a baby's chart.  Mother states baby has been latching on and breastfeeding well. Baby prefers left breast. LC discussed positioning which may help baby latch better on right breast.    Reviewed breastfeeding basics:  Supply and demand,  stomach size, early  Feeding cues, skin to skin, positioning and baby led latch-on, assymetrical latch with signs of good, deep latch vs shallow, feeding frequency and duration, and log sheet for tracking infant feedings and output.  Breastfeeding Booklet and Warm line information given.  Discussed typical  weight loss and the importance of infant weight checks with pediatrician 1-2 post discharge.    Shield use recommended due to short nipples; use of shield affords deeper more comfortable latching with sustained rhythmic suckling and intermittent swallowing noted.  Proper care, application and use of shield discussed; anticipatory guidance shared.    Mother will successfully establish breastfeeding by feeding in response to early feeding cues   or wake every 3h, will obtain deep latch, and will keep log of feedings/output.  Taught to BF at hunger cues and or q 2-3 hrs and to offer 10-20 drops of hand expressed colostrum at any non-feeds.      Left Breast: Soft  Left Nipple: Protrude (short)  Right Nipple: Protrude (short)  Right Breast: Soft  Position and Latch: Independently               Breast Care: Nursing pads, Using breast shells, Nipple shield, Other (Comment) (hydro gel pads)     Lactation Comment: Baby just finished breastfeed on left breast for 20 minutes and was still awake. Mother put baby on right breast but baby only did a few suckles then fell asleep.

## 2024-01-24 NOTE — PROGRESS NOTES
Post-Operative Day Number 1 Progress Note    Patient doing well post-op day 1 from  delivery without significant complaints.  Pain controlled on current medication.  Voiding without difficulty, normal lochia.    Vitals:  Patient Vitals for the past 8 hrs:   BP Temp Pulse Resp SpO2   24 0641 109/61 97.7 °F (36.5 °C) 61 14 98 %   24 0350 110/71 97.7 °F (36.5 °C) 55 18 98 %     Temp (24hrs), Av °F (36.7 °C), Min:97.5 °F (36.4 °C), Max:98.6 °F (37 °C)      Vital signs stable, afebrile.    Exam:    Patient without distress.  Abdomen soft, fundus firm at level of umbilicus, nontender.    Incision dry and clean without erythema.  Lower extremities are negative for swelling, cords or tenderness.    Labs:   LABS:    Lab Results   Component Value Date/Time    WBC 17.0 2024 03:51 AM    WBC 13.0 2024 06:10 AM    WBC 14.4 2023 06:19 AM    HGB 10.1 2024 03:51 AM    HGB 11.3 2024 06:10 AM    HGB 10.1 2023 06:19 AM    HCT 30.6 2024 03:51 AM    HCT 34.2 2024 06:10 AM    HCT 30.9 2023 06:19 AM     2024 03:51 AM     2024 06:10 AM     2023 06:19 AM    ALT 19 2023 03:05 PM    AST 24 2023 03:05 PM       Assessment and Plan:  Patient appears to be having uncomplicated post- course.  Continue routine post-op care and maternal education.

## 2024-01-25 VITALS
HEART RATE: 53 BPM | SYSTOLIC BLOOD PRESSURE: 113 MMHG | RESPIRATION RATE: 16 BRPM | DIASTOLIC BLOOD PRESSURE: 72 MMHG | OXYGEN SATURATION: 99 % | TEMPERATURE: 97.9 F

## 2024-01-25 PROCEDURE — 6370000000 HC RX 637 (ALT 250 FOR IP): Performed by: OBSTETRICS & GYNECOLOGY

## 2024-01-25 RX ADMIN — DOCUSATE SODIUM 100 MG: 100 CAPSULE, LIQUID FILLED ORAL at 08:36

## 2024-01-25 RX ADMIN — IBUPROFEN 800 MG: 800 TABLET, FILM COATED ORAL at 00:05

## 2024-01-25 RX ADMIN — ACETAMINOPHEN 1000 MG: 500 TABLET ORAL at 06:40

## 2024-01-25 RX ADMIN — Medication 1 TABLET: at 08:37

## 2024-01-25 ASSESSMENT — PAIN DESCRIPTION - LOCATION
LOCATION: ABDOMEN;INCISION
LOCATION: ABDOMEN;INCISION

## 2024-01-25 ASSESSMENT — PAIN DESCRIPTION - DESCRIPTORS
DESCRIPTORS: ACHING;BURNING;SORE
DESCRIPTORS: ACHING;CRAMPING;SORE

## 2024-01-25 ASSESSMENT — PAIN SCALES - GENERAL
PAINLEVEL_OUTOF10: 4
PAINLEVEL_OUTOF10: 3

## 2024-01-25 NOTE — PROGRESS NOTES
Reviewed discharge instructions with patient. Patient off unit in stable condition by wheelchair with volunteers for discharge home. Per Dr. Browne, patient is aware to follow up in 2 weeks. Prescription sent to pharmacy. Patient denies any headache, dizziness, N/V, or pain at this time. Infant in car seat and discharged with mother.

## 2024-01-25 NOTE — PROGRESS NOTES
Post-Operative Day Number 2 Progress Note    Patient doing well post-op day 2 from  delivery without significant complaints.  Pain controlled on current medication.  Voiding without difficulty, normal lochia.  Tolerating regular diet without nausea or vomiting.  +flatus    Vitals:  Patient Vitals for the past 8 hrs:   BP Temp Pulse Resp SpO2   24 0802 113/72 97.9 °F (36.6 °C) 53 16 99 %     Temp (24hrs), Av.1 °F (36.7 °C), Min:97.9 °F (36.6 °C), Max:98.2 °F (36.8 °C)        Exam:  Patient without distress               Lungs:  CTA bilaterally               CV:  Regular rate and rhythm               Abdomen soft, nondistended, normal bowel sounds               Uterus: fundus firm at level of umbilicus, nontender.                 Incision: no erythema, exudate or induration                Lower extremities are negative for cords or tenderness; minimal swelling.    Labs: No results found for this or any previous visit (from the past 24 hour(s)).      Assessment and Plan:  Postoperative day #2 s/p pLTCS at 39w3d for breech presentation,  uncomplicated post- course.      - routine postop care  - appropriate for discharge today. To f/u with Dr. Navarro in 6w or as needed. Given precuations.     MD Bobby Dash OB/Gyn

## 2024-01-25 NOTE — LACTATION NOTE
This note was copied from a baby's chart.  Mother just finished nursing on left breast, attempted to latch on right but baby was very fussy, no latch achieved on right.  Tips given.  Reviewed positions.  Discharge info reviewed and printed info given.  Mother measured for flange size upon request.    Chart shows numerous feedings, void, stool WNL.  Discussed importance of monitoring outputs and feedings on first week of life.  Discussed ways to tell if baby is  getting enough breast milk, ie  voids and stools, change in color of stool, and return to birth wt within 2 weeks.  Follow up with pediatrician visit for weight check in 1-2 days (per AAP guidelines.)  Encouraged to call Warm Line  [phone]  for any questions/problems that arise. Mother also given breastfeeding support group dates and times for any future needs    Engorgement Care Guidelines:  Reviewed how milk is made and normal phases of milk production.  Taught care of engorged breasts - physiologic breastfeeding encouraged with use of cool packs (no ice directly on skin). Consider use of NSAIDS where appropriate for discomfort and inflammation. Can employ light touch, lymphatic drainage techniques on tender grandular tissues. Anticipatory guidance shared.    Pt will successfully establish breastfeeding by feeding in response to early feeding cues   or wake every 3h, will obtain deep latch, and will keep log of feedings/output.  Taught to BF at hunger cues and or q 2-3 hrs and to offer 10-20 drops of hand expressed colostrum at any non-feeds.      Left Breast: Soft  Left Nipple: Protrude (short)  Right Nipple: Protrude (short)  Right Breast: Soft  Position and Latch: Independently  Signs of Transfer: Nutritive sucking  Maternal Response: Relaxed and confident, Attentive, Comfortable           Latch: Grasps breast, tongue down, lips flanged, rhythmic sucking  Audible Swallowing: Spontaneous and intermittent (24 hours old)  Type of Nipple: Everted (after

## 2024-01-29 RX ORDER — VALACYCLOVIR HYDROCHLORIDE 1 G/1
2000 TABLET, FILM COATED ORAL 2 TIMES DAILY
Qty: 20 TABLET | Refills: 1 | Status: SHIPPED | OUTPATIENT
Start: 2024-01-29 | End: 2024-01-30

## 2024-03-04 NOTE — PROGRESS NOTES
Maribeth Vaughan is a 28 y.o. female returns for a routine post-partum follow-up visit     Chief Complaint   Patient presents with    Postpartum Care       Postpartum Depression: Low Risk  (2024)    [place]  Depression Scale     Last EPDS Total Score: 0     Last EPDS Self Harm Result: Never         Type of delivery: primary  section  Date of Delivery: 2024  Breastfeeding: yes  Bleeding Resolved: yes  Birth Control: undecided.  Last Pap: normal obtained 10/2022.        Problems: no problems    1. Have you been to the ER, urgent care clinic, or hospitalized since your last visit? No    2. Have you seen or consulted any other health care providers outside of the [facility] since your last visit? No    Examination chaperoned by Aron Frausto CMA.

## 2024-03-05 ENCOUNTER — POSTPARTUM VISIT (OUTPATIENT)
Age: 29
End: 2024-03-05

## 2024-03-05 VITALS — BODY MASS INDEX: 23.52 KG/M2 | SYSTOLIC BLOOD PRESSURE: 108 MMHG | WEIGHT: 137 LBS | DIASTOLIC BLOOD PRESSURE: 70 MMHG

## 2024-03-05 PROCEDURE — 99024 POSTOP FOLLOW-UP VISIT: CPT | Performed by: OBSTETRICS & GYNECOLOGY

## 2024-03-05 NOTE — PROGRESS NOTES
Postpartum evaluation    Maribeth Vaughan is a 28 y.o. female who presents for a postpartum exam.     Her baby is doing well.    She has had the following significant problems since her delivery: none    Per Nursing Note:  Type of delivery: primary  section  Date of Delivery: 2024  Breastfeeding: yes  Bleeding Resolved: yes  Birth Control: undecided.  Last Pap: normal obtained 10/2022.    /70   Wt 62.1 kg (137 lb)   Breastfeeding Yes   BMI 23.52 kg/m²     PHYSICAL EXAMINATION    Constitutional  Appearance: well-nourished, well developed, alert, in no acute distress    HENT  Head and Face: appears normal    Neck  Inspection/Palpation: normal appearance, no masses or tenderness  Lymph Nodes: no lymphadenopathy present  Thyroid: gland size normal, nontender, no nodules or masses present on palpation    Gastrointestinal  Abdominal Examination: abdomen non-tender to palpation, normal bowel sounds, no masses present, incision well healed.  Liver and spleen: no hepatomegaly present, spleen not palpable  Hernias: no hernias identified    Skin  General Inspection: no rash, no lesions identified    Neurologic/Psychiatric  Mental Status:  Orientation: grossly oriented to person, place and time  Mood and Affect: mood normal, affect appropriate    Assessment:  Normal postpartum check    Plan:  RTO for AE.        Please note that this dictation was completed with [employer], the computer voice recognition software.  Quite often unanticipated grammatical, syntax, homophones, and other interpretive errors are inadvertently transcribed by the computer software.  Please disregard these errors.  Please excuse any errors that have escaped final proofreading.

## 2024-03-08 NOTE — PROGRESS NOTES
Maribeth Vaughan is a 28 y.o. female presents for a problem visit.    Chief Complaint   Patient presents with    Procedure           No LMP recorded.    Birth Control: none.    Last Pap: normal obtained 1.5 year(s) ago.        1. Have you been to the ER, urgent care clinic, or hospitalized since your last visit? No    2. Have you seen or consulted any other health care providers outside of the [facility] since your last visit? No    Device number [id number], expiration date 4/1/26  Office supplied device.    Chart reviewed for the following:   Aron BAILEY CMA, have reviewed the History, Physical and updated the Allergic reactions for Maribeth Vaughan     TIME OUT performed immediately prior to start of procedure:   Aron BAILEY CMA, have performed the following reviews on Maribeth Vaughan prior to the start of the procedure:            * Patient was identified by name and date of birth   * Agreement on procedure being performed was verified  * Risks and Benefits explained to the patient  * Procedure site verified and marked as necessary  * Patient was positioned for comfort  * Consent was signed and verified     Time: 755am    Date of procedure: 3/11/24    Procedure performed by:  Yesenia Navarro MD       Provider assisted by: Aron Frausto CMA     Patient assisted by: self    How tolerated by patient: tolerated the procedure well with no complications    Post Procedural Pain Scale: 2 - Hurts Little Bit    Comments: none    Examination chaperoned by Aron Frausto CMA.

## 2024-03-11 ENCOUNTER — PROCEDURE VISIT (OUTPATIENT)
Age: 29
End: 2024-03-11
Payer: COMMERCIAL

## 2024-03-11 DIAGNOSIS — Z30.430 ENCOUNTER FOR IUD INSERTION: Primary | ICD-10-CM

## 2024-03-11 LAB
HCG, PREGNANCY, URINE, POC: NEGATIVE
VALID INTERNAL CONTROL, POC: YES

## 2024-03-11 PROCEDURE — 58300 INSERT INTRAUTERINE DEVICE: CPT | Performed by: OBSTETRICS & GYNECOLOGY

## 2024-03-11 PROCEDURE — 81025 URINE PREGNANCY TEST: CPT | Performed by: OBSTETRICS & GYNECOLOGY

## 2024-03-11 NOTE — PROGRESS NOTES
Mirena IUD INSERTION  Indications:  Maribeth Vaughan is a ,  28 y.o. female White (non-) No LMP recorded.  Her LMP was normal in duration and amount of flow. She  presents for insertion of an IUD.    The risks, benefits and alternatives of IUD insertion were discussed in detail at last visit.  She also has reviewed Mirena information. She has elected to proceed with the insertion today and she states she has no further questions. A urine pregnancy test was negative No results found for: \"SPEP\", \"UPEP\"  Procedure:  The pelvic exam revealed normal external genitalia. On bimanual exam the uterus was anteverted and normal in size with no tenderness present. A speculum was inserted into the vagina and the cervix was visualized. The cervix was prepped with zephiran solution. The anterior lip of the cervix was grasped with a single toothed tenaculum. The uterus was sounded with a Salinas sound to 7 centimeters. A Mirena was then inserted without difficulty. The string was cut to 3 centimeters.She experienced a moderate  amount of cramping.   Post Procedure Status:   She tolerated the procedure with moderate discomfort. The patient was observed for 10 minutes after the insertion. There were no complications.   Patient was discharged in stable condition.

## 2024-05-17 NOTE — PROGRESS NOTES
Maribeth Vaughan is a 28 y.o. female presents for a problem visit.    Chief Complaint   Patient presents with    Follow-up     No LMP recorded.  Birth Control: IUD.  Last Pap: normal obtained 1.5 year(s) ago.    The patient is reporting having: IUD check up.  Pt underwent a mirena insertion on 3/11/24.  She has not had any problems since placement.        1. Have you been to the ER, urgent care clinic, or hospitalized since your last visit? No    2. Have you seen or consulted any other health care providers outside of the [facility] since your last visit? No    Examination chaperoned by Aron Frausto CMA.

## 2024-05-20 ENCOUNTER — OFFICE VISIT (OUTPATIENT)
Age: 29
End: 2024-05-20
Payer: COMMERCIAL

## 2024-05-20 DIAGNOSIS — Z30.431 IUD CHECK UP: Primary | ICD-10-CM

## 2024-05-20 PROCEDURE — 99212 OFFICE O/P EST SF 10 MIN: CPT | Performed by: OBSTETRICS & GYNECOLOGY

## 2024-05-20 NOTE — PROGRESS NOTES
GYN Problem Visit Note    Chief Complaint   Follow-up   No LMP recorded..      HPI  Maribeth Vaughan is a 28 y.o. female who complains of   Chief Complaint   Patient presents with    Follow-up       She presents for an IUD check.    Per Nursing Note:  No LMP recorded.  Birth Control: IUD.  Last Pap: normal obtained 1.5 year(s) ago.     The patient is reporting having: IUD check up.  Pt underwent a mirena insertion on 3/11/24.  She has not had any problems since placement.    No past medical history on file.  Past Surgical History:   Procedure Laterality Date     SECTION N/A 2024     SECTION performed by Yesenia Navarro MD at [facility] L&D OR    WISDOM TOOTH EXTRACTION       Social History     Occupational History    Not on file   Tobacco Use    Smoking status: Never    Smokeless tobacco: Never   Substance and Sexual Activity    Alcohol use: Yes    Drug use: Never    Sexual activity: Not on file     Family History   Problem Relation Age of Onset    No Known Problems Mother        No Known Allergies  Prior to Admission medications    Medication Sig Start Date End Date Taking? Authorizing Provider   Prenatal Vit-Fe Fumarate-FA (PRENATAL 1+1 PO) Take by mouth daily    Provider, MD Paula        Review of Systems: History obtained from the patient  Constitutional: negative for weight loss, fever, night sweats  Breast: negative for breast lumps, nipple discharge, galactorrhea  GI: negative for change in bowel habits, abdominal pain, black or bloody stools  : negative for frequency, dysuria, hematuria, vaginal discharge  MSK: negative for back pain, joint pain, muscle pain  Skin: negative for itching, rash, hives  Psych: negative for anxiety, depression, change in mood      Objective:  There were no vitals taken for this visit.    Physical Exam:   PHYSICAL EXAMINATION    Constitutional  Appearance: well-nourished, well developed, alert, in no acute distress    Gastrointestinal  Abdominal

## 2025-03-27 NOTE — PROGRESS NOTES
Maribeth Vaughan is a 29 y.o. female returns for an annual exam     Chief Complaint   Patient presents with    Annual Exam       No LMP recorded. (Menstrual status: IUD).  Her periods are nonexistent in flow.  Problems: no problems  Birth Control: IUD.  Last Pap: normal obtained 3 year(s) ago.  She does not have a history of BRIANNE 2, 3 or cervical cancer.   With regard to the Gardisil vaccine, she  thinks she got them          Examination chaperoned by JULISSA WALDRON RN.

## 2025-03-28 ENCOUNTER — OFFICE VISIT (OUTPATIENT)
Age: 30
End: 2025-03-28
Payer: COMMERCIAL

## 2025-03-28 VITALS
HEART RATE: 86 BPM | RESPIRATION RATE: 16 BRPM | HEIGHT: 64 IN | SYSTOLIC BLOOD PRESSURE: 107 MMHG | DIASTOLIC BLOOD PRESSURE: 73 MMHG | BODY MASS INDEX: 22.2 KG/M2 | WEIGHT: 130 LBS

## 2025-03-28 DIAGNOSIS — Z01.419 ENCOUNTER FOR WELL WOMAN EXAM WITH ROUTINE GYNECOLOGICAL EXAM: ICD-10-CM

## 2025-03-28 DIAGNOSIS — Z12.4 SCREENING FOR CERVICAL CANCER: Primary | ICD-10-CM

## 2025-03-28 PROCEDURE — 99395 PREV VISIT EST AGE 18-39: CPT | Performed by: OBSTETRICS & GYNECOLOGY

## 2025-03-28 PROCEDURE — 99459 PELVIC EXAMINATION: CPT | Performed by: OBSTETRICS & GYNECOLOGY

## 2025-03-28 SDOH — ECONOMIC STABILITY: FOOD INSECURITY: WITHIN THE PAST 12 MONTHS, THE FOOD YOU BOUGHT JUST DIDN'T LAST AND YOU DIDN'T HAVE MONEY TO GET MORE.: NEVER TRUE

## 2025-03-28 SDOH — ECONOMIC STABILITY: FOOD INSECURITY: WITHIN THE PAST 12 MONTHS, YOU WORRIED THAT YOUR FOOD WOULD RUN OUT BEFORE YOU GOT MONEY TO BUY MORE.: NEVER TRUE

## 2025-03-28 ASSESSMENT — PATIENT HEALTH QUESTIONNAIRE - PHQ9
SUM OF ALL RESPONSES TO PHQ QUESTIONS 1-9: 0
SUM OF ALL RESPONSES TO PHQ QUESTIONS 1-9: 0
1. LITTLE INTEREST OR PLEASURE IN DOING THINGS: NOT AT ALL
2. FEELING DOWN, DEPRESSED OR HOPELESS: NOT AT ALL
SUM OF ALL RESPONSES TO PHQ QUESTIONS 1-9: 0
SUM OF ALL RESPONSES TO PHQ QUESTIONS 1-9: 0

## 2025-03-28 NOTE — PROGRESS NOTES
Annual exam  Maribeth Vaughan is a ,  29 y.o. female   No LMP recorded. (Menstrual status: IUD).    She presents for her annual checkup.     She has no significant complaints     Sexual history:    She  reports being sexually active and has had partner(s) who are male. She reports using the following method of birth control/protection: I.U.D..    Per Nursing Note:  No LMP recorded. (Menstrual status: IUD).  Her periods are nonexistent in flow.  Problems: no problems  Birth Control: IUD.  Last Pap: normal obtained 3 year(s) ago.  She does not have a history of BRIANNE 2, 3 or cervical cancer.   With regard to the Gardisil vaccine, she  thinks she got them    No past medical history on file.  Past Surgical History:   Procedure Laterality Date     SECTION N/A 2024     SECTION performed by Yesenia Navarro MD at [facility] L[facility]D OR    WISDOM TOOTH EXTRACTION         Current Outpatient Medications   Medication Sig Dispense Refill    Prenatal Vit-Fe Fumarate-FA (PRENATAL 1+1 PO) Take by mouth daily (Patient not taking: Reported on 3/28/2025)       No current facility-administered medications for this visit.     Allergies: Patient has no known allergies.     Tobacco History:  reports that she has never smoked. She has never used smokeless tobacco.  Alcohol Abuse:  reports that she does not currently use alcohol.  Drug Abuse:  reports no history of drug use.    Family Medical/Cancer History:   Family History   Problem Relation Age of Onset    No Known Problems Mother         Review of Systems - History obtained from the patient  Constitutional: negative for weight loss, fever, night sweats  HEENT: negative for hearing loss, earache, congestion, snoring, sorethroat  CV: negative for chest pain, palpitations, edema  Resp: negative for cough, shortness of breath, wheezing  GI: negative for change in bowel habits, abdominal pain, black or bloody stools  : negative for frequency, dysuria, hematuria, vaginal

## 2025-04-03 LAB
., LABCORP: NORMAL
CYTOLOGIST CVX/VAG CYTO: NORMAL
CYTOLOGY CVX/VAG DOC CYTO: NORMAL
CYTOLOGY CVX/VAG DOC THIN PREP: NORMAL
DX ICD CODE: NORMAL
OTHER STN SPEC: NORMAL
SERVICE CMNT-IMP: NORMAL
STAT OF ADQ CVX/VAG CYTO-IMP: NORMAL

## 2025-04-04 ENCOUNTER — RESULTS FOLLOW-UP (OUTPATIENT)
Age: 30
End: 2025-04-04

## (undated) DEVICE — SUTURE ABSRB BRAID COAT UD CT NO 1 36IN VCRL J959H

## (undated) DEVICE — SOLIDIFIER FLUID 3000 CC ABSORB

## (undated) DEVICE — 3000CC GUARDIAN II: Brand: GUARDIAN

## (undated) DEVICE — SPONGE: LAP 18X18 W  200/CS: Brand: MEDICAL ACTION INDUSTRIES

## (undated) DEVICE — GARMENT,MEDLINE,DVT,INT,CALF,MED, GEN2: Brand: MEDLINE

## (undated) DEVICE — ELECTRODE PT RET AD L9FT HI MOIST COND ADH HYDRGEL CORDED

## (undated) DEVICE — SOLUTION IV 1000ML 0.9% SOD CHL

## (undated) DEVICE — INTENT OT USE PROVIDES A STERILE INTERFACE BETWEEN THE OPERATING ROOM SURGICAL LAMPS (NON-STERILE) AND THE SURGEON OR STAFF WORKING IN THE STERILE FIELD.: Brand: ASPEN® ALC PLUS LIGHT HANDLE COVER

## (undated) DEVICE — TOWEL,OR,DSP,ST,BLUE,STD,2/PK,40PK/CS: Brand: MEDLINE

## (undated) DEVICE — STRIP,CLOSURE,WOUND,MEDI-STRIP,1/2X4: Brand: MEDLINE

## (undated) DEVICE — C-SECTION II-LF: Brand: MEDLINE INDUSTRIES, INC.

## (undated) DEVICE — DRESSING BORDERED ADH GZ UNIV GEN USE 8INX4IN AND 6INX2IN

## (undated) DEVICE — TRAY,URINE METER,100% SILICONE,16FR10ML: Brand: MEDLINE

## (undated) DEVICE — SUTURE VCRL SZ 0 L36IN ABSRB VLT L40MM CT 1/2 CIR J358H

## (undated) DEVICE — STERILE POLYISOPRENE POWDER-FREE SURGICAL GLOVES: Brand: PROTEXIS

## (undated) DEVICE — MASTISOL ADHESIVE LIQ 2/3ML

## (undated) DEVICE — Z INACTIVE NO ACTIVE SUPPLIER APPLICATOR MEDICATED 26 CC TINT HI-LITE ORNG STRL CHLORAPREP

## (undated) DEVICE — SUTURE PLN GUT SZ 2-0 L27IN ABSRB YELLOWISH TAN L40MM CT 853H

## (undated) DEVICE — SYRINGE IRRIG 60ML SFT PLIABLE BLB EZ TO GRP 1 HND USE W/

## (undated) DEVICE — SUTURE VCRL SZ 2-0 L36IN ABSRB UD L36MM CT-1 1/2 CIR J945H

## (undated) DEVICE — BLADE CLIPPER GEN PURP NS

## (undated) DEVICE — SUTURE VCRL SZ 4-0 L27IN ABSRB UD L24MM FS-1 3/8 CIR REV J441H

## (undated) DEVICE — CLEANER ES TIP W2XL2IN ADH BK RADPQ FOR S STL ELECTRD

## (undated) DEVICE — PENCIL ES L3M ROCK SWCH S STL HEX LOK BLDE ELECTRD HOLSTER

## (undated) DEVICE — SUTURE CHROMIC GUT SZ 2-0 L36IN ABSRB BRN L36MM CT-1 1/2 923H